# Patient Record
Sex: MALE | Race: BLACK OR AFRICAN AMERICAN | Employment: UNEMPLOYED | URBAN - METROPOLITAN AREA
[De-identification: names, ages, dates, MRNs, and addresses within clinical notes are randomized per-mention and may not be internally consistent; named-entity substitution may affect disease eponyms.]

---

## 2018-03-15 ENCOUNTER — APPOINTMENT (OUTPATIENT)
Dept: GENERAL RADIOLOGY | Age: 23
End: 2018-03-15
Payer: MEDICAID

## 2018-03-15 ENCOUNTER — HOSPITAL ENCOUNTER (EMERGENCY)
Age: 23
Discharge: HOME | End: 2018-03-15
Payer: MEDICAID

## 2018-03-15 VITALS
WEIGHT: 160 LBS | BODY MASS INDEX: 23.7 KG/M2 | HEART RATE: 81 BPM | OXYGEN SATURATION: 98 % | HEIGHT: 69 IN | TEMPERATURE: 98 F | RESPIRATION RATE: 16 BRPM

## 2018-03-15 DIAGNOSIS — M25.571 ACUTE RIGHT ANKLE PAIN: Primary | ICD-10-CM

## 2018-03-15 PROCEDURE — 73610 X-RAY EXAM OF ANKLE: CPT

## 2018-03-15 PROCEDURE — 99283 EMERGENCY DEPT VISIT LOW MDM: CPT

## 2018-03-15 RX ORDER — NAPROXEN 500 MG/1
500 TABLET ORAL 2 TIMES DAILY WITH MEALS
Qty: 14 TABLET | Refills: 0 | Status: SHIPPED | OUTPATIENT
Start: 2018-03-15 | End: 2018-08-16

## 2018-03-15 ASSESSMENT — PAIN SCALES - GENERAL: PAINLEVEL_OUTOF10: 8

## 2018-03-15 ASSESSMENT — PAIN DESCRIPTION - ORIENTATION: ORIENTATION: RIGHT

## 2018-03-15 ASSESSMENT — PAIN DESCRIPTION - LOCATION: LOCATION: ANKLE

## 2018-03-15 NOTE — ED PROVIDER NOTES
Capillary refill: normal.  Knee:              Tenderness:  none. Swelling: None. Deformity: no.             ROM: full range of motion. Skin:  no erythema, rash or wounds noted. Foot:              Tenderness:  none. Swelling: None. Deformity: no.             ROM: full range of motion. Skin:  no erythema, rash or wounds noted. Gait:  normal.   Lymphatics: No lymphangitis or adenopathy noted. Neurological:  Oriented. Motor functions intact. Lab / Imaging Results   (All laboratory and radiology results have been personally reviewed by myself)  Labs:  No results found for this visit on 03/15/18. Imaging: All Radiology results interpreted by Radiologist unless otherwise noted. XR ANKLE RIGHT (MIN 3 VIEWS)   Final Result   No convincing fracture or dislocation. ED Course / Medical Decision Making   Medications - No data to display  ED Course      Consults:   None    Procedure(s):   none    MDM:       Films were obtained based on moderate suspicion for bony injury as per history/physical findings . Plan is subsequently for symptom control, limited use and  immobilization with appropriate outpatient follow-up. X-ray shows no acute process. Certainly anti-inflammatories. Will follow with corporate care. Discharged from the ER in stable condition. Counseling: The emergency provider has spoken with the patient and discussed todays results, in addition to providing specific details for the plan of care and counseling regarding the diagnosis and prognosis. Questions are answered at this time and they are agreeable with the plan. Assessment      1. Acute right ankle pain      Plan   Discharge to home  Patient condition is good    New Medications     Current Discharge Medication List        Electronically signed by LITA Springer   DD: 3/15/18  **This report was transcribed using voice recognition software.

## 2018-04-13 ENCOUNTER — HOSPITAL ENCOUNTER (OUTPATIENT)
Dept: MRI IMAGING | Age: 23
Discharge: HOME | End: 2018-04-15
Payer: MEDICAID

## 2018-04-13 DIAGNOSIS — S93.401A SPRAIN OF RIGHT ANKLE, UNSPECIFIED LIGAMENT, INITIAL ENCOUNTER: ICD-10-CM

## 2018-04-13 PROCEDURE — 73721 MRI JNT OF LWR EXTRE W/O DYE: CPT

## 2018-08-16 ENCOUNTER — HOSPITAL ENCOUNTER (EMERGENCY)
Age: 23
Discharge: HOME | End: 2018-08-17
Payer: MEDICAID

## 2018-08-16 VITALS
WEIGHT: 160 LBS | HEIGHT: 69 IN | RESPIRATION RATE: 16 BRPM | SYSTOLIC BLOOD PRESSURE: 154 MMHG | BODY MASS INDEX: 23.7 KG/M2 | OXYGEN SATURATION: 99 % | DIASTOLIC BLOOD PRESSURE: 95 MMHG | HEART RATE: 82 BPM | TEMPERATURE: 97.9 F

## 2018-08-16 DIAGNOSIS — S93.401A SPRAIN OF RIGHT ANKLE, UNSPECIFIED LIGAMENT, INITIAL ENCOUNTER: Primary | ICD-10-CM

## 2018-08-16 PROCEDURE — 99283 EMERGENCY DEPT VISIT LOW MDM: CPT

## 2018-08-16 ASSESSMENT — PAIN DESCRIPTION - PAIN TYPE: TYPE: CHRONIC PAIN

## 2018-08-16 ASSESSMENT — PAIN DESCRIPTION - LOCATION: LOCATION: FOOT

## 2018-08-16 ASSESSMENT — PAIN DESCRIPTION - DESCRIPTORS: DESCRIPTORS: STABBING

## 2018-08-16 ASSESSMENT — PAIN SCALES - GENERAL: PAINLEVEL_OUTOF10: 8

## 2018-08-16 ASSESSMENT — PAIN DESCRIPTION - ORIENTATION: ORIENTATION: RIGHT

## 2018-08-17 ENCOUNTER — APPOINTMENT (OUTPATIENT)
Dept: GENERAL RADIOLOGY | Age: 23
End: 2018-08-17
Payer: MEDICAID

## 2018-08-17 PROCEDURE — 73610 X-RAY EXAM OF ANKLE: CPT

## 2018-08-17 RX ORDER — NAPROXEN 500 MG/1
500 TABLET ORAL 2 TIMES DAILY
Qty: 14 TABLET | Refills: 0 | Status: SHIPPED | OUTPATIENT
Start: 2018-08-17 | End: 2018-09-25

## 2018-08-17 NOTE — ED PROVIDER NOTES
[facility]  HPI:  8/16/18, Time: 11:49 PM         Barb Copeland is a 24 y.o. male presenting to the ED for right ankle pain , beginning today . The complaint has been persistent, mild in severity, and worsened by movement of ankle . Patient comes in with complaint of right foot pain. He states he has a history of frequent ankle dislocations in the past he states he was at work today when his ankle dislocated twice he was able to self reduce on the ankle. He states he's having some swelling to the area now. Denies any fall or injury. Review of Systems:   Pertinent positives and negatives are stated within HPI, all other systems reviewed and are negative.          --------------------------------------------- PAST HISTORY ---------------------------------------------  Past Medical History:  has a past medical history of Anxiety attack and Schizophrenia ([facility] [address] Past Surgical History:  has no past surgical history on file. Social History:  reports that he has never smoked. He has never used smokeless tobacco. He reports that he does not drink alcohol or use drugs. Family History: family history is not on file. The patients home medications have been reviewed. Allergies: Depakote [divalproex sodium]    -------------------------------------------------- RESULTS -------------------------------------------------  All laboratory and radiology results have been personally reviewed by myself   LABS:  No results found for this visit on 08/16/18. RADIOLOGY:  Interpreted by Radiologist.  XR ANKLE RIGHT (MIN 3 VIEWS)    (Results Pending)       ------------------------- NURSING NOTES AND VITALS REVIEWED ---------------------------   The nursing notes within the ED encounter and vital signs as below have been reviewed.    BP (!) 154/95   Pulse 82   Temp 97.9 °F (36.6 °C)   Resp 16   Ht 5' 9\" (1.753 m)   Wt 160 lb (72.6 kg)   SpO2 99%   BMI 23.63 kg/m²   Oxygen Saturation Interpretation:

## 2018-09-25 ENCOUNTER — HOSPITAL ENCOUNTER (EMERGENCY)
Age: 23
Discharge: HOME | End: 2018-09-25
Payer: MEDICAID

## 2018-09-25 ENCOUNTER — APPOINTMENT (OUTPATIENT)
Dept: GENERAL RADIOLOGY | Age: 23
End: 2018-09-25
Payer: MEDICAID

## 2018-09-25 VITALS
HEART RATE: 55 BPM | OXYGEN SATURATION: 95 % | DIASTOLIC BLOOD PRESSURE: 71 MMHG | SYSTOLIC BLOOD PRESSURE: 121 MMHG | TEMPERATURE: 97 F | RESPIRATION RATE: 17 BRPM

## 2018-09-25 DIAGNOSIS — M25.511 CHRONIC RIGHT SHOULDER PAIN: ICD-10-CM

## 2018-09-25 DIAGNOSIS — G89.29 CHRONIC RIGHT SHOULDER PAIN: ICD-10-CM

## 2018-09-25 DIAGNOSIS — M21.829 HILL SACHS DEFORMITY: ICD-10-CM

## 2018-09-25 DIAGNOSIS — Z87.39 HISTORY OF CLOSED SHOULDER DISLOCATION: Primary | ICD-10-CM

## 2018-09-25 PROCEDURE — 99283 EMERGENCY DEPT VISIT LOW MDM: CPT

## 2018-09-25 PROCEDURE — 73030 X-RAY EXAM OF SHOULDER: CPT

## 2018-09-25 RX ORDER — NAPROXEN 500 MG/1
500 TABLET ORAL 2 TIMES DAILY
Qty: 14 TABLET | Refills: 0 | Status: SHIPPED | OUTPATIENT
Start: 2018-09-25 | End: 2019-03-07

## 2018-09-25 ASSESSMENT — PAIN DESCRIPTION - LOCATION: LOCATION: SHOULDER

## 2018-09-25 ASSESSMENT — PAIN SCALES - GENERAL: PAINLEVEL_OUTOF10: 8

## 2018-09-25 ASSESSMENT — PAIN DESCRIPTION - FREQUENCY: FREQUENCY: INTERMITTENT

## 2018-09-25 ASSESSMENT — PAIN DESCRIPTION - ORIENTATION: ORIENTATION: RIGHT

## 2018-09-25 ASSESSMENT — PAIN DESCRIPTION - PAIN TYPE: TYPE: CHRONIC PAIN

## 2018-09-25 NOTE — ED PROVIDER NOTES
Right anterior. Tenderness: mild              Swelling: Mild. Deformity: no.              ROM: decreased due to pain and concern for further dislocation. Skin:  no erythema, rash or wounds noted. Neurovascular: Motor deficit: decreased ROM due to pain and fear of dislocating. Sensory deficit: none. Pulse deficit: none. Capillary refill: normal.    Elbow:              Tenderness:  none. Swelling: None. Deformity: no.              ROM: full range of motion. Skin:  no erythema, rash or wounds noted. Lymphatics: No lymphangitis or edema noted  Neurological:  Oriented. Motor functions intact. Lab / Imaging Results   (All laboratory and radiology results have been personally reviewed by myself)  Labs:  No results found for this visit on 09/25/18. Imaging: All Radiology results interpreted by Radiologist unless otherwise noted. XR SHOULDER RIGHT (MIN 2 VIEWS)   Final Result   1. No acute osseous injury is identified. Followup imaging can be   performed for persistent or worsening symptoms. 2.  Possible Hill-Sachs lesion. ED Course / Medical Decision Making   Medications - No data to display     Consult(s):   None    Procedure(s):   none    MDM:   Pt presents for hx of shoulder dislocations. Films were obtained based on low suspicion for bony injury as per history/physical findings. Hill sachs deformity seen indicating previous dislocations. In place at this time. Will place in sling/swathe and give pt name of ortho to follow-up for persistent dislocations. Pt denies pain meds in ER. Will be sent home with Naprosyn. Plan is subsequently for symptom control, limited use and  immobilization with appropriate outpatient follow-up. Pt is in no acute distress, non-toxic, and appropriate for outpatient management. Pt educated on need to return to ER if new or worsening symptoms.  Pt

## 2018-09-26 ENCOUNTER — TELEPHONE (OUTPATIENT)
Dept: ORTHOPEDIC SURGERY | Age: 23
End: 2018-09-26

## 2018-10-02 ENCOUNTER — APPOINTMENT (OUTPATIENT)
Dept: GENERAL RADIOLOGY | Age: 23
End: 2018-10-02
Payer: MEDICAID

## 2018-10-02 ENCOUNTER — HOSPITAL ENCOUNTER (EMERGENCY)
Age: 23
Discharge: HOME | End: 2018-10-02
Payer: MEDICAID

## 2018-10-02 VITALS
HEIGHT: 69 IN | RESPIRATION RATE: 16 BRPM | BODY MASS INDEX: 23.7 KG/M2 | DIASTOLIC BLOOD PRESSURE: 74 MMHG | HEART RATE: 57 BPM | OXYGEN SATURATION: 98 % | WEIGHT: 160 LBS | TEMPERATURE: 97.1 F | SYSTOLIC BLOOD PRESSURE: 142 MMHG

## 2018-10-02 DIAGNOSIS — M25.511 ACUTE PAIN OF RIGHT SHOULDER: Primary | ICD-10-CM

## 2018-10-02 PROCEDURE — 99283 EMERGENCY DEPT VISIT LOW MDM: CPT

## 2018-10-02 PROCEDURE — 73030 X-RAY EXAM OF SHOULDER: CPT

## 2018-10-02 RX ORDER — IBUPROFEN 800 MG/1
800 TABLET ORAL EVERY 8 HOURS PRN
Qty: 21 TABLET | Refills: 0 | Status: SHIPPED | OUTPATIENT
Start: 2018-10-02 | End: 2019-03-07

## 2018-10-03 NOTE — ED PROVIDER NOTES
this time and they are agreeable with the plan.      --------------------------------- IMPRESSION AND DISPOSITION ---------------------------------    IMPRESSION  1. Acute pain of right shoulder        DISPOSITION  Disposition: Discharge to home  Patient condition is good      NOTE: This report was transcribed using voice recognition software.  Every effort was made to ensure accuracy; however, inadvertent computerized transcription errors may be present     Ebony Ceballos, ADRIA - CNP  10/03/18 0232

## 2018-10-17 ENCOUNTER — OFFICE VISIT (OUTPATIENT)
Dept: ORTHOPEDIC SURGERY | Age: 23
End: 2018-10-17
Payer: MEDICAID

## 2018-10-17 VITALS
HEART RATE: 55 BPM | HEIGHT: 70 IN | BODY MASS INDEX: 23.62 KG/M2 | TEMPERATURE: 97.6 F | SYSTOLIC BLOOD PRESSURE: 137 MMHG | DIASTOLIC BLOOD PRESSURE: 80 MMHG | WEIGHT: 165 LBS

## 2018-10-17 DIAGNOSIS — M25.311 SHOULDER INSTABILITY, RIGHT: Primary | ICD-10-CM

## 2018-10-17 DIAGNOSIS — M25.312 SHOULDER INSTABILITY, LEFT: ICD-10-CM

## 2018-10-17 PROCEDURE — G8427 DOCREV CUR MEDS BY ELIG CLIN: HCPCS | Performed by: ORTHOPAEDIC SURGERY

## 2018-10-17 PROCEDURE — 99214 OFFICE O/P EST MOD 30 MIN: CPT | Performed by: ORTHOPAEDIC SURGERY

## 2018-10-17 PROCEDURE — G8420 CALC BMI NORM PARAMETERS: HCPCS | Performed by: ORTHOPAEDIC SURGERY

## 2018-10-17 PROCEDURE — 1036F TOBACCO NON-USER: CPT | Performed by: ORTHOPAEDIC SURGERY

## 2018-10-17 PROCEDURE — G8484 FLU IMMUNIZE NO ADMIN: HCPCS | Performed by: ORTHOPAEDIC SURGERY

## 2018-10-17 NOTE — PROGRESS NOTES
New Shoulder Patient Visit     Referring Provider:   Senia Garcia MD  [facility]  [place], [address]    CHIEF COMPLAINT:   Chief Complaint   Patient presents with    Shoulder Pain     Right shoulder dislocation. Pt. states it started 8 yrs. ago weight lifting. Most recent dislocation 2-3 wks ago (boxing sparring). HPI:      Mary Carmen Pierce is a 24y.o. year old male who is seen today  for evaluation of right shoulder pain. He states that about 8 years ago he suffered an injury to the shoulder where he felt his shoulder \"dislocated\" while lifting doing overhead press. Since that time he has had several instances where he felt the shoulder pop out of place. It has never required reduction in the emergency department. He does remain active and enjoys boxing sparring. He has had issues with this due to pain. Most recent event was 3 weeks ago when he felt the shoulder pop out and go back in. Since that time he has improved somewhat. He has not been Doing much with the shoulder. He has had no recent treatment      PAST MEDICAL HISTORY  Past Medical History:   Diagnosis Date    Anxiety attack     Schizophrenia ([facility] [address]        PAST SURGICAL HISTORY  Past Surgical History:   Procedure Laterality Date    DENTAL SURGERY         FAMILY HISTORY   History reviewed. No pertinent family history. SOCIAL HISTORY  Social History     Occupational History    Not on file.      Social History Main Topics    Smoking status: Never Smoker    Smokeless tobacco: Never Used    Alcohol use No      Comment: denies at this time    Drug use: No    Sexual activity: Not on file       CURRENT MEDICATIONS     Current Outpatient Prescriptions:     ibuprofen (IBU) 800 MG tablet, Take 1 tablet by mouth every 8 hours as needed for Pain, Disp: 21 tablet, Rfl: 0    naproxen (NAPROSYN) 500 MG tablet, Take 1 tablet by mouth 2 times daily for 7 days, Disp: 14 tablet, Rfl: 0    ALLERGIES  Allergies   Allergen

## 2019-03-07 ENCOUNTER — APPOINTMENT (OUTPATIENT)
Dept: GENERAL RADIOLOGY | Age: 24
End: 2019-03-07
Payer: MEDICAID

## 2019-03-07 ENCOUNTER — HOSPITAL ENCOUNTER (EMERGENCY)
Age: 24
Discharge: HOME | End: 2019-03-07
Attending: EMERGENCY MEDICINE
Payer: MEDICAID

## 2019-03-07 VITALS
OXYGEN SATURATION: 98 % | HEART RATE: 68 BPM | BODY MASS INDEX: 19.25 KG/M2 | TEMPERATURE: 97.6 F | DIASTOLIC BLOOD PRESSURE: 82 MMHG | HEIGHT: 74 IN | SYSTOLIC BLOOD PRESSURE: 118 MMHG | RESPIRATION RATE: 16 BRPM | WEIGHT: 150 LBS

## 2019-03-07 DIAGNOSIS — M25.511 ACUTE PAIN OF RIGHT SHOULDER: Primary | ICD-10-CM

## 2019-03-07 PROCEDURE — 99283 EMERGENCY DEPT VISIT LOW MDM: CPT

## 2019-03-07 PROCEDURE — 6370000000 HC RX 637 (ALT 250 FOR IP): Performed by: EMERGENCY MEDICINE

## 2019-03-07 PROCEDURE — 73030 X-RAY EXAM OF SHOULDER: CPT

## 2019-03-07 RX ORDER — IBUPROFEN 800 MG/1
800 TABLET ORAL EVERY 8 HOURS PRN
Qty: 15 TABLET | Refills: 0 | Status: SHIPPED | OUTPATIENT
Start: 2019-03-07 | End: 2019-03-20 | Stop reason: ALTCHOICE

## 2019-03-07 RX ORDER — IBUPROFEN 800 MG/1
800 TABLET ORAL ONCE
Status: COMPLETED | OUTPATIENT
Start: 2019-03-07 | End: 2019-03-07

## 2019-03-07 RX ADMIN — IBUPROFEN 800 MG: 800 TABLET ORAL at 05:51

## 2019-03-07 ASSESSMENT — PAIN DESCRIPTION - LOCATION: LOCATION: SHOULDER

## 2019-03-07 ASSESSMENT — PAIN DESCRIPTION - FREQUENCY: FREQUENCY: CONTINUOUS

## 2019-03-07 ASSESSMENT — PAIN DESCRIPTION - PAIN TYPE: TYPE: ACUTE PAIN

## 2019-03-07 ASSESSMENT — PAIN DESCRIPTION - ONSET: ONSET: ON-GOING

## 2019-03-07 ASSESSMENT — PAIN SCALES - GENERAL: PAINLEVEL_OUTOF10: 8

## 2019-03-07 ASSESSMENT — PAIN DESCRIPTION - ORIENTATION: ORIENTATION: RIGHT

## 2019-03-07 ASSESSMENT — PAIN DESCRIPTION - DESCRIPTORS: DESCRIPTORS: TIRING

## 2019-03-12 ENCOUNTER — OFFICE VISIT (OUTPATIENT)
Dept: FAMILY MEDICINE CLINIC | Age: 24
End: 2019-03-12
Payer: MEDICAID

## 2019-03-12 VITALS
SYSTOLIC BLOOD PRESSURE: 137 MMHG | HEIGHT: 70 IN | HEART RATE: 71 BPM | OXYGEN SATURATION: 98 % | RESPIRATION RATE: 18 BRPM | DIASTOLIC BLOOD PRESSURE: 76 MMHG | BODY MASS INDEX: 25.77 KG/M2 | WEIGHT: 180 LBS | TEMPERATURE: 98 F

## 2019-03-12 DIAGNOSIS — M25.511 RIGHT SHOULDER PAIN, UNSPECIFIED CHRONICITY: ICD-10-CM

## 2019-03-12 PROCEDURE — 99212 OFFICE O/P EST SF 10 MIN: CPT | Performed by: FAMILY MEDICINE

## 2019-03-12 PROCEDURE — 1036F TOBACCO NON-USER: CPT | Performed by: FAMILY MEDICINE

## 2019-03-12 PROCEDURE — G8484 FLU IMMUNIZE NO ADMIN: HCPCS | Performed by: FAMILY MEDICINE

## 2019-03-12 PROCEDURE — G8419 CALC BMI OUT NRM PARAM NOF/U: HCPCS | Performed by: FAMILY MEDICINE

## 2019-03-12 PROCEDURE — G8427 DOCREV CUR MEDS BY ELIG CLIN: HCPCS | Performed by: FAMILY MEDICINE

## 2019-03-12 PROCEDURE — 99213 OFFICE O/P EST LOW 20 MIN: CPT | Performed by: FAMILY MEDICINE

## 2019-03-12 ASSESSMENT — ENCOUNTER SYMPTOMS
NAUSEA: 0
ABDOMINAL PAIN: 0
PHOTOPHOBIA: 0
VOMITING: 0
ABDOMINAL DISTENTION: 0
WHEEZING: 0
COUGH: 0
CHEST TIGHTNESS: 0
CONSTIPATION: 0
SHORTNESS OF BREATH: 0
DIARRHEA: 0

## 2019-03-12 ASSESSMENT — PATIENT HEALTH QUESTIONNAIRE - PHQ9
1. LITTLE INTEREST OR PLEASURE IN DOING THINGS: 0
SUM OF ALL RESPONSES TO PHQ QUESTIONS 1-9: 2
SUM OF ALL RESPONSES TO PHQ QUESTIONS 1-9: 2
2. FEELING DOWN, DEPRESSED OR HOPELESS: 2
SUM OF ALL RESPONSES TO PHQ9 QUESTIONS 1 & 2: 2

## 2019-03-20 ENCOUNTER — OFFICE VISIT (OUTPATIENT)
Dept: ORTHOPEDIC SURGERY | Age: 24
End: 2019-03-20
Payer: MEDICAID

## 2019-03-20 VITALS
SYSTOLIC BLOOD PRESSURE: 135 MMHG | HEIGHT: 69 IN | HEART RATE: 55 BPM | TEMPERATURE: 97.7 F | WEIGHT: 180 LBS | BODY MASS INDEX: 26.66 KG/M2 | DIASTOLIC BLOOD PRESSURE: 73 MMHG

## 2019-03-20 DIAGNOSIS — M25.312 SHOULDER INSTABILITY, LEFT: Primary | ICD-10-CM

## 2019-03-20 DIAGNOSIS — M25.311 SHOULDER INSTABILITY, RIGHT: ICD-10-CM

## 2019-03-20 PROCEDURE — G8419 CALC BMI OUT NRM PARAM NOF/U: HCPCS | Performed by: ORTHOPAEDIC SURGERY

## 2019-03-20 PROCEDURE — G8484 FLU IMMUNIZE NO ADMIN: HCPCS | Performed by: ORTHOPAEDIC SURGERY

## 2019-03-20 PROCEDURE — 99213 OFFICE O/P EST LOW 20 MIN: CPT | Performed by: ORTHOPAEDIC SURGERY

## 2019-03-20 PROCEDURE — G8427 DOCREV CUR MEDS BY ELIG CLIN: HCPCS | Performed by: ORTHOPAEDIC SURGERY

## 2019-03-20 PROCEDURE — 1036F TOBACCO NON-USER: CPT | Performed by: ORTHOPAEDIC SURGERY

## 2019-03-25 ENCOUNTER — TELEPHONE (OUTPATIENT)
Dept: FAMILY MEDICINE CLINIC | Age: 24
End: 2019-03-25

## 2019-03-25 DIAGNOSIS — G89.29 CHRONIC LEFT SHOULDER PAIN: Primary | ICD-10-CM

## 2019-03-25 DIAGNOSIS — M25.512 CHRONIC LEFT SHOULDER PAIN: Primary | ICD-10-CM

## 2019-03-27 ENCOUNTER — HOSPITAL ENCOUNTER (OUTPATIENT)
Dept: PHYSICAL THERAPY | Age: 24
Setting detail: THERAPIES SERIES
Discharge: HOME | End: 2019-03-27
Payer: MEDICAID

## 2019-04-09 ENCOUNTER — HOSPITAL ENCOUNTER (OUTPATIENT)
Dept: MRI IMAGING | Age: 24
Discharge: HOME | End: 2019-04-11
Payer: MEDICAID

## 2019-04-09 ENCOUNTER — HOSPITAL ENCOUNTER (OUTPATIENT)
Dept: GENERAL RADIOLOGY | Age: 24
Discharge: HOME | End: 2019-04-11
Payer: MEDICAID

## 2019-04-09 VITALS
OXYGEN SATURATION: 100 % | WEIGHT: 180 LBS | SYSTOLIC BLOOD PRESSURE: 120 MMHG | HEIGHT: 69 IN | DIASTOLIC BLOOD PRESSURE: 56 MMHG | HEART RATE: 63 BPM | BODY MASS INDEX: 26.66 KG/M2 | RESPIRATION RATE: 18 BRPM

## 2019-04-09 DIAGNOSIS — M25.311 SHOULDER INSTABILITY, RIGHT: ICD-10-CM

## 2019-04-09 PROCEDURE — 6360000004 HC RX CONTRAST MEDICATION: Performed by: RADIOLOGY

## 2019-04-09 PROCEDURE — 6360000004 HC RX CONTRAST MEDICATION: Performed by: PHYSICIAN ASSISTANT

## 2019-04-09 PROCEDURE — 73222 MRI JOINT UPR EXTREM W/DYE: CPT

## 2019-04-09 PROCEDURE — 73040 CONTRAST X-RAY OF SHOULDER: CPT

## 2019-04-09 PROCEDURE — 23350 INJECTION FOR SHOULDER X-RAY: CPT

## 2019-04-09 PROCEDURE — A9579 GAD-BASE MR CONTRAST NOS,1ML: HCPCS | Performed by: PHYSICIAN ASSISTANT

## 2019-04-09 RX ADMIN — GADOTERIDOL 1 ML: 279.3 INJECTION, SOLUTION INTRAVENOUS at 09:10

## 2019-04-09 RX ADMIN — IOPAMIDOL 15 ML: 408 INJECTION, SOLUTION INTRATHECAL at 08:09

## 2019-04-09 NOTE — H&P
Interventional Radiology   Attending Pre-operative History and Physical    DIAGNOSIS:    Patient Active Problem List   Diagnosis    Schizophrenia ([facility] [address]       CHIEF COMPLAINT:  Right shoulder pain    No current outpatient medications on file. Current Facility-Administered Medications:     iopamidol (ISOVUE-M 200) 41 % injection 15 mL, 15 mL, Intravenous, ONCE PRN, Susie Kelly MD    Allergies   Allergen Reactions    Depakote [Divalproex Sodium]      Seizure        Past Medical History:   Diagnosis Date    Anxiety attack     Schizophrenia [facility])        Past Surgical History:   Procedure Laterality Date    DENTAL SURGERY         No family history on file.     Social History     Socioeconomic History    Marital status:      Spouse name: Not on file    Number of children: Not on file    Years of education: Not on file    Highest education level: Not on file   Occupational History    Not on file   Social Needs    Financial resource strain: Not on file    Food insecurity:     Worry: Not on file     Inability: Not on file    Transportation needs:     Medical: Not on file     Non-medical: Not on file   Tobacco Use    Smoking status: Never Smoker    Smokeless tobacco: Never Used   Substance and Sexual Activity    Alcohol use: No     Comment: denies at this time    Drug use: No    Sexual activity: Not on file   Lifestyle    Physical activity:     Days per week: Not on file     Minutes per session: Not on file    Stress: Not on file   Relationships    Social connections:     Talks on phone: Not on file     Gets together: Not on file     Attends Taoist service: Not on file     Active member of club or organization: Not on file     Attends meetings of clubs or organizations: Not on file     Relationship status: Not on file    Intimate partner violence:     Fear of current or ex partner: Not on file     Emotionally abused: Not on file     Physically abused: Not on file     Forced

## 2019-04-09 NOTE — PROGRESS NOTES
IRFLOWSHEET    Date: 4/9/2019    Time: 7:51 AM     Exam: Fluoroscopy Guided RIGHT shoulder Arthrogram    Radiologist Performing Procedure: Heidy Betts PA-C    Nurse Reporting/Phone:     Nurse Receiving: Susan Willson    Permit signed:      Yes    ID Band Checklist: Yes    Allergies: Depakote [divalproex sodium]     TIME OUT: 0818    PROCEDURE START TIME: 9340    Puncture Site: Right anterior shoulder     Puncture Time: 0820    Catheters: 20gx3.5\"    LABS: No results found for: INR, PROTIME        Lab Results   Component Value Date    CREATININE 1.3 (H) 02/01/2017    BUN 13 02/01/2017          Lab Results   Component Value Date    HGB 13.2 02/01/2017    HCT 40.8 02/01/2017     02/01/2017         PROCEDURE END TIME: 0825    Total Contrast: Isovue M200 (15cc), Prohance (0.1cc)    Fluoroscopy Time: 0.7 min    Complications:  None    Comments: Patient brought into room, discussed procedure. Heidy Betts PA-C answered all questions and concerns related to the procedure. Treatment consent signed. Patient positioned and sterile prepped for the procedure. 1% Lidocaine administered by [employer]. Patient tolerated procedure well. Right anterior shoulder site cleaned and dressed with a bandage. Vitals stable pre/post procedure. Discharge papers reviewed and signed. Patient escorted out of department with all belongings and without distress.

## 2019-04-09 NOTE — BRIEF OP NOTE
Brief Postoperative Note    Troy Pickens  YOB: 1995  [id number]    Pre-operative Diagnosis and Procedure: right shoulder pain; right shoulder arthrogram    Post-operative Diagnosis: Same    Anesthesia: Local    Estimated Blood Loss: < 10 cc    Provider: Chano Garcia PA-C, indirect supervision by Alpa Hyatt M.D.     Complications: none    Specimen obtained: none     Findings: none     CANDACE HSU   4/9/2019 8:10 AM

## 2019-04-22 ENCOUNTER — TELEPHONE (OUTPATIENT)
Dept: ORTHOPEDIC SURGERY | Age: 24
End: 2019-04-22

## 2019-05-06 ENCOUNTER — OFFICE VISIT (OUTPATIENT)
Dept: ORTHOPEDIC SURGERY | Age: 24
End: 2019-05-06
Payer: MEDICAID

## 2019-05-06 VITALS
TEMPERATURE: 97.3 F | HEIGHT: 69 IN | WEIGHT: 170 LBS | BODY MASS INDEX: 25.18 KG/M2 | SYSTOLIC BLOOD PRESSURE: 131 MMHG | HEART RATE: 68 BPM | DIASTOLIC BLOOD PRESSURE: 73 MMHG

## 2019-05-06 DIAGNOSIS — M25.311 SHOULDER INSTABILITY, RIGHT: Primary | ICD-10-CM

## 2019-05-06 PROCEDURE — G8427 DOCREV CUR MEDS BY ELIG CLIN: HCPCS | Performed by: ORTHOPAEDIC SURGERY

## 2019-05-06 PROCEDURE — G8419 CALC BMI OUT NRM PARAM NOF/U: HCPCS | Performed by: ORTHOPAEDIC SURGERY

## 2019-05-06 PROCEDURE — 1036F TOBACCO NON-USER: CPT | Performed by: ORTHOPAEDIC SURGERY

## 2019-05-06 PROCEDURE — 99213 OFFICE O/P EST LOW 20 MIN: CPT | Performed by: ORTHOPAEDIC SURGERY

## 2019-05-15 ENCOUNTER — HOSPITAL ENCOUNTER (OUTPATIENT)
Dept: CT IMAGING | Age: 24
Discharge: HOME | End: 2019-05-17
Payer: MEDICAID

## 2019-05-15 DIAGNOSIS — M25.311 SHOULDER INSTABILITY, RIGHT: ICD-10-CM

## 2019-05-15 PROCEDURE — 76376 3D RENDER W/INTRP POSTPROCES: CPT

## 2019-05-15 PROCEDURE — 73200 CT UPPER EXTREMITY W/O DYE: CPT

## 2019-06-05 ENCOUNTER — OFFICE VISIT (OUTPATIENT)
Dept: ORTHOPEDIC SURGERY | Age: 24
End: 2019-06-05
Payer: MEDICAID

## 2019-06-05 VITALS
HEIGHT: 69 IN | DIASTOLIC BLOOD PRESSURE: 81 MMHG | HEART RATE: 75 BPM | SYSTOLIC BLOOD PRESSURE: 142 MMHG | BODY MASS INDEX: 25.18 KG/M2 | WEIGHT: 170 LBS

## 2019-06-05 DIAGNOSIS — M25.311 SHOULDER INSTABILITY, RIGHT: ICD-10-CM

## 2019-06-05 DIAGNOSIS — Z01.818 PRE-OP EXAM: Primary | ICD-10-CM

## 2019-06-05 PROCEDURE — 1036F TOBACCO NON-USER: CPT | Performed by: ORTHOPAEDIC SURGERY

## 2019-06-05 PROCEDURE — G8427 DOCREV CUR MEDS BY ELIG CLIN: HCPCS | Performed by: ORTHOPAEDIC SURGERY

## 2019-06-05 PROCEDURE — 99213 OFFICE O/P EST LOW 20 MIN: CPT | Performed by: ORTHOPAEDIC SURGERY

## 2019-06-05 PROCEDURE — G8419 CALC BMI OUT NRM PARAM NOF/U: HCPCS | Performed by: ORTHOPAEDIC SURGERY

## 2019-06-05 RX ORDER — HYDROCODONE BITARTRATE AND ACETAMINOPHEN 5; 325 MG/1; MG/1
1 TABLET ORAL EVERY 6 HOURS PRN
Qty: 20 TABLET | Refills: 0 | Status: SHIPPED | OUTPATIENT
Start: 2019-06-05 | End: 2019-06-10

## 2019-06-05 RX ORDER — KETOROLAC TROMETHAMINE 10 MG/1
10 TABLET, FILM COATED ORAL EVERY 6 HOURS PRN
Qty: 8 TABLET | Refills: 0 | Status: SHIPPED | OUTPATIENT
Start: 2019-06-05 | End: 2019-06-12

## 2019-06-05 NOTE — PROGRESS NOTES
Department of Orthopedic Surgery  Attending Pre-operative History and Physical        DIAGNOSIS:  Right shoulder recurrent instability     INDICATION:  Failed Conservative Management     PROCEDURE:  RIGHT SHOULDER ARTHROSCOPY LABRAL REPAIR WITH CAPSULORRHAPHY POSSIBLE REMPLISSAGE  ++ARTHREX++  ++ISB++    CHIEF COMPLAINT:  Right shoulder instability    History Obtained From:  patient    HISTORY OF PRESENT ILLNESS:      The patient is a 21 y.o. male with significant past medical history of right shoulder dislocation and resulting instability. He has had several dislocations in the past, most recently over 6 months ago. He was referred to me for assessment. He underwent MRI as well as CT scan. There did appear to be possibly a labral tissue as well as increased capsular volume anterior. He displayed anterior instability on exam.  Given his failure of conservative treatment he was offered arthroscopic stabilization. We discussed this would be in the form of labral repair and capsulorrhaphy with possible Remplissage. We discussed risks in detail. These risks include but are not limited to infection, neurovascular injury, postoperative stiffness, recurrent instability, need for other surgeries, unforeseen risks. He understands and would like to proceed    Past Medical History:        Diagnosis Date    Anxiety attack     Schizophrenia [facility])        Past Surgical History:        Procedure Laterality Date    DENTAL SURGERY         Medications Prior to Admission:   Not in a hospital admission. Allergies:  Depakote [divalproex sodium]    History of allergic reaction to anesthesia:  Never received anesthesia    Social History:   TOBACCO:   reports that he has never smoked. He has never used smokeless tobacco.  ETOH:   reports that he does not drink alcohol. DRUGS:   reports that he does not use drugs. Family History:   No family history on file.     REVIEW OF SYSTEMS:  CONSTITUTIONAL:  negative  RESPIRATORY: negative  CARDIOVASCULAR:  negative  MUSCULOSKELETAL:  negative except for  HPI  NEUROLOGICAL:  negative    PHYSICAL EXAM:     VITALS:  BP (!) 142/81 (Site: Right Upper Arm, Position: Sitting, Cuff Size: Medium Adult)   Pulse 75   Ht 5' 9\" (1.753 m)   Wt 170 lb (77.1 kg)   BMI 25.10 kg/m²     Gen: AOx3, NAD    Heart:  normal apical pulses, normal S1 and S2 and no edema    Lungs:  No increased work of breathing, good air exchange     Abdomen:  no scars, non-distended and non-tender    Extremities:  No clubbing, cyanosis, or edema    Musculoskeletal:  On exam of the shoulder, he can forward elevate about 150° with mild pain. External rotation is 30°. Internal rotation is to T6. In the supine position with the shoulder abductor 90°, external rotation is about 80° with apprehension. Internal rotation is 70°. Load-and-shift is to call to assess due to guarding. Belly press test is intact. Resisted external rotation is intact. Spencer's test is painful      DATA:  CBC:   Lab Results   Component Value Date    WBC 6.0 02/01/2017    RBC 5.07 02/01/2017    HGB 13.2 02/01/2017    HCT 40.8 02/01/2017    MCV 80.5 02/01/2017    MCH 26.0 02/01/2017    MCHC 32.4 02/01/2017    RDW 14.4 02/01/2017     02/01/2017    MPV 11.4 02/01/2017     BMP:    Lab Results   Component Value Date     02/01/2017    K 3.9 02/01/2017     02/01/2017    CO2 23 02/01/2017    BUN 13 02/01/2017    LABALBU 4.2 02/01/2017    CREATININE 1.3 02/01/2017    CALCIUM 8.6 02/01/2017    GFRAA >60 02/01/2017    LABGLOM >60 02/01/2017    GLUCOSE 106 02/01/2017       Radiology Review:  X-rays, MRI and CT reviewed. These show evidence of previous labral injury with possibly of labral tissue anterior. ASSESSMENT AND PLAN:    1. Patient is a 21 y.o. male with above specified procedure planned right shoulder arthroscopy, labral repair and capsulorrhaphy, possible Remplissage with anesthesia    2.   Procedure options, risks and benefits reviewed with patient. Patient expresses understanding and has signed consent form to proceed.     3.  Patient and family were provided with pre-op and post-op instructions, prescription medications, and any other supplied needed post op (slings, braces, etc.)    Controlled Substances Monitoring:            Lorena Preciado MD  Orthopaedic Surgery   6/5/19  9:21 AM

## 2019-06-05 NOTE — LETTER
[address] [facility].  [address] [facility] [address]  Phone: [phone]  Fax: [phone]    Lizbet Velasquez MD        June 5, 2019     Patient: Luis Durbin   YOB: 1995   Date of Visit: 6/5/2019       To Whom It May Concern: It is my medical opinion that Herber Ohara Is undergoing surgery on his right shoulder on 6/13/2019. I anticipate 3 months out from work that involves any lifting with his right arm. If you have any questions or concerns, please don't hesitate to call.     Sincerely,          Lizbet Velasquez MD

## 2019-06-06 ENCOUNTER — TELEPHONE (OUTPATIENT)
Dept: ORTHOPEDIC SURGERY | Age: 24
End: 2019-06-06

## 2019-06-07 ENCOUNTER — ANESTHESIA EVENT (OUTPATIENT)
Dept: OPERATING ROOM | Age: 24
End: 2019-06-07
Payer: MEDICAID

## 2019-06-12 RX ORDER — KETOROLAC TROMETHAMINE 10 MG/1
10 TABLET, FILM COATED ORAL EVERY 6 HOURS PRN
COMMUNITY
End: 2019-06-21

## 2019-06-12 NOTE — PROGRESS NOTES
[facility] PRE-ADMISSION TESTING INSTRUCTIONS    The Preadmission Testing patient is instructed accordingly using the following criteria (check applicable):    ARRIVAL INSTRUCTIONS:  [x] Parking the day of Surgery is located in the Main Entrance lot. Upon entering the door, make an immediate right to the surgery reception desk    [] [id number] is available Monday through Friday 6 am to 6 pm    [x] Bring photo ID and insurance card    [] Bring in a copy of Living will or Durable Power of  papers. [x] Please be sure to arrange for responsible adult to provide transportation to and from the hospital    [x] Please arrange for responsible adult to be with you for the 24 hour period post procedure due to having anesthesia      GENERAL INSTRUCTIONS:    [x] Nothing by mouth after midnight, including gum, candy, mints or water    [x] You may brush your teeth, but do not swallow any water    [x] Take medications as instructed with 1-2 oz of water    [] Stop herbal supplements and vitamins 5 days prior to procedure    [] Follow preop dosing of blood thinners per physician instructions    [] Take 1/2 dose of evening insulin, but no insulin after midnight    [] No oral diabetic medications after midnight    [] If diabetic and have low blood sugar or feel symptomatic, take 1-2oz apple juice only    [] Bring inhalers day of surgery    [] Bring C-PAP/ Bi-Pap day of surgery    [] Bring urine specimen day of surgery    [x] Shower or bath with soap, lather and rinse well, AM of Surgery, no lotion, powders or creams to surgical site    [] Follow bowel prep as instructed per surgeon    [x] No tobacco products within 24 hours of surgery     [x] No alcohol or illegal drug use within 24 hours of surgery.     [x] Jewelry, body piercing's, eyeglasses, contact lenses and dentures are not permitted into surgery (bring cases)      [] Please do not wear any nail polish, make up or hair

## 2019-06-13 ENCOUNTER — HOSPITAL ENCOUNTER (OUTPATIENT)
Age: 24
Setting detail: OUTPATIENT SURGERY
Discharge: HOME | End: 2019-06-13
Attending: ORTHOPAEDIC SURGERY | Admitting: ORTHOPAEDIC SURGERY
Payer: MEDICAID

## 2019-06-13 ENCOUNTER — ANESTHESIA (OUTPATIENT)
Dept: OPERATING ROOM | Age: 24
End: 2019-06-13
Payer: MEDICAID

## 2019-06-13 VITALS — DIASTOLIC BLOOD PRESSURE: 52 MMHG | TEMPERATURE: 95 F | SYSTOLIC BLOOD PRESSURE: 96 MMHG | OXYGEN SATURATION: 95 %

## 2019-06-13 VITALS
DIASTOLIC BLOOD PRESSURE: 72 MMHG | TEMPERATURE: 97.7 F | SYSTOLIC BLOOD PRESSURE: 134 MMHG | RESPIRATION RATE: 14 BRPM | OXYGEN SATURATION: 98 % | WEIGHT: 170 LBS | BODY MASS INDEX: 25.18 KG/M2 | HEIGHT: 69 IN | HEART RATE: 83 BPM

## 2019-06-13 DIAGNOSIS — Z98.890 S/P ARTHROSCOPY OF SHOULDER: Primary | ICD-10-CM

## 2019-06-13 PROCEDURE — 2500000003 HC RX 250 WO HCPCS: Performed by: REGISTERED NURSE

## 2019-06-13 PROCEDURE — 3600000004 HC SURGERY LEVEL 4 BASE: Performed by: ORTHOPAEDIC SURGERY

## 2019-06-13 PROCEDURE — 7100000011 HC PHASE II RECOVERY - ADDTL 15 MIN: Performed by: ORTHOPAEDIC SURGERY

## 2019-06-13 PROCEDURE — 6360000002 HC RX W HCPCS: Performed by: ORTHOPAEDIC SURGERY

## 2019-06-13 PROCEDURE — 2720000010 HC SURG SUPPLY STERILE: Performed by: ORTHOPAEDIC SURGERY

## 2019-06-13 PROCEDURE — C1713 ANCHOR/SCREW BN/BN,TIS/BN: HCPCS | Performed by: ORTHOPAEDIC SURGERY

## 2019-06-13 PROCEDURE — 3600000014 HC SURGERY LEVEL 4 ADDTL 15MIN: Performed by: ORTHOPAEDIC SURGERY

## 2019-06-13 PROCEDURE — 3700000000 HC ANESTHESIA ATTENDED CARE: Performed by: ORTHOPAEDIC SURGERY

## 2019-06-13 PROCEDURE — 2580000003 HC RX 258: Performed by: ORTHOPAEDIC SURGERY

## 2019-06-13 PROCEDURE — 6360000002 HC RX W HCPCS

## 2019-06-13 PROCEDURE — 2709999900 HC NON-CHARGEABLE SUPPLY: Performed by: ORTHOPAEDIC SURGERY

## 2019-06-13 PROCEDURE — 6360000002 HC RX W HCPCS: Performed by: REGISTERED NURSE

## 2019-06-13 PROCEDURE — L3650 SO 8 ABD RESTRAINT PRE OTS: HCPCS | Performed by: ORTHOPAEDIC SURGERY

## 2019-06-13 PROCEDURE — 7100000000 HC PACU RECOVERY - FIRST 15 MIN: Performed by: ORTHOPAEDIC SURGERY

## 2019-06-13 PROCEDURE — 7100000001 HC PACU RECOVERY - ADDTL 15 MIN: Performed by: ORTHOPAEDIC SURGERY

## 2019-06-13 PROCEDURE — 64415 NJX AA&/STRD BRCH PLXS IMG: CPT | Performed by: ANESTHESIOLOGY

## 2019-06-13 PROCEDURE — 2580000003 HC RX 258: Performed by: REGISTERED NURSE

## 2019-06-13 PROCEDURE — 2500000003 HC RX 250 WO HCPCS: Performed by: ORTHOPAEDIC SURGERY

## 2019-06-13 PROCEDURE — 29806 SHO ARTHRS SRG CAPSULORRAPHY: CPT | Performed by: ORTHOPAEDIC SURGERY

## 2019-06-13 PROCEDURE — 7100000010 HC PHASE II RECOVERY - FIRST 15 MIN: Performed by: ORTHOPAEDIC SURGERY

## 2019-06-13 PROCEDURE — 6370000000 HC RX 637 (ALT 250 FOR IP): Performed by: ANESTHESIOLOGY

## 2019-06-13 PROCEDURE — 3700000001 HC ADD 15 MINUTES (ANESTHESIA): Performed by: ORTHOPAEDIC SURGERY

## 2019-06-13 DEVICE — ANCHOR SUT L12.7MM DIA3MM BIOCOMPOSITE KNOTLESS W/ SZ 2: Type: IMPLANTABLE DEVICE | Site: SHOULDER | Status: FUNCTIONAL

## 2019-06-13 RX ORDER — ROPIVACAINE HYDROCHLORIDE 5 MG/ML
30 INJECTION, SOLUTION EPIDURAL; INFILTRATION; PERINEURAL ONCE
Status: COMPLETED | OUTPATIENT
Start: 2019-06-13 | End: 2019-06-13

## 2019-06-13 RX ORDER — ONDANSETRON 2 MG/ML
INJECTION INTRAMUSCULAR; INTRAVENOUS PRN
Status: DISCONTINUED | OUTPATIENT
Start: 2019-06-13 | End: 2019-06-13 | Stop reason: SDUPTHER

## 2019-06-13 RX ORDER — SODIUM CHLORIDE 0.9 % (FLUSH) 0.9 %
10 SYRINGE (ML) INJECTION PRN
Status: DISCONTINUED | OUTPATIENT
Start: 2019-06-13 | End: 2019-06-13 | Stop reason: HOSPADM

## 2019-06-13 RX ORDER — HYDROCODONE BITARTRATE AND ACETAMINOPHEN 5; 325 MG/1; MG/1
1 TABLET ORAL ONCE
Status: COMPLETED | OUTPATIENT
Start: 2019-06-13 | End: 2019-06-13

## 2019-06-13 RX ORDER — ROCURONIUM BROMIDE 10 MG/ML
INJECTION, SOLUTION INTRAVENOUS PRN
Status: DISCONTINUED | OUTPATIENT
Start: 2019-06-13 | End: 2019-06-13 | Stop reason: SDUPTHER

## 2019-06-13 RX ORDER — SODIUM CHLORIDE 9 MG/ML
INJECTION, SOLUTION INTRAVENOUS CONTINUOUS PRN
Status: DISCONTINUED | OUTPATIENT
Start: 2019-06-13 | End: 2019-06-13 | Stop reason: SDUPTHER

## 2019-06-13 RX ORDER — MEPERIDINE HYDROCHLORIDE 25 MG/ML
12.5 INJECTION INTRAMUSCULAR; INTRAVENOUS; SUBCUTANEOUS EVERY 5 MIN PRN
Status: DISCONTINUED | OUTPATIENT
Start: 2019-06-13 | End: 2019-06-13 | Stop reason: HOSPADM

## 2019-06-13 RX ORDER — MIDAZOLAM HYDROCHLORIDE 1 MG/ML
1 INJECTION INTRAMUSCULAR; INTRAVENOUS EVERY 10 MIN PRN
Status: DISCONTINUED | OUTPATIENT
Start: 2019-06-13 | End: 2019-06-13 | Stop reason: HOSPADM

## 2019-06-13 RX ORDER — GLYCOPYRROLATE 1 MG/5 ML
SYRINGE (ML) INTRAVENOUS PRN
Status: DISCONTINUED | OUTPATIENT
Start: 2019-06-13 | End: 2019-06-13 | Stop reason: SDUPTHER

## 2019-06-13 RX ORDER — ONDANSETRON 2 MG/ML
4 INJECTION INTRAMUSCULAR; INTRAVENOUS
Status: DISCONTINUED | OUTPATIENT
Start: 2019-06-13 | End: 2019-06-13 | Stop reason: HOSPADM

## 2019-06-13 RX ORDER — PROPOFOL 10 MG/ML
INJECTION, EMULSION INTRAVENOUS PRN
Status: DISCONTINUED | OUTPATIENT
Start: 2019-06-13 | End: 2019-06-13 | Stop reason: SDUPTHER

## 2019-06-13 RX ORDER — SODIUM CHLORIDE 9 MG/ML
INJECTION, SOLUTION INTRAVENOUS CONTINUOUS
Status: DISCONTINUED | OUTPATIENT
Start: 2019-06-13 | End: 2019-06-13 | Stop reason: HOSPADM

## 2019-06-13 RX ORDER — HYDROCODONE BITARTRATE AND ACETAMINOPHEN 5; 325 MG/1; MG/1
1 TABLET ORAL EVERY 6 HOURS PRN
Qty: 20 TABLET | Refills: 0 | Status: SHIPPED | OUTPATIENT
Start: 2019-06-13 | End: 2019-06-18

## 2019-06-13 RX ORDER — FENTANYL CITRATE 50 UG/ML
INJECTION, SOLUTION INTRAMUSCULAR; INTRAVENOUS PRN
Status: DISCONTINUED | OUTPATIENT
Start: 2019-06-13 | End: 2019-06-13 | Stop reason: SDUPTHER

## 2019-06-13 RX ORDER — FENTANYL CITRATE 50 UG/ML
50 INJECTION, SOLUTION INTRAMUSCULAR; INTRAVENOUS EVERY 10 MIN PRN
Status: DISCONTINUED | OUTPATIENT
Start: 2019-06-13 | End: 2019-06-13 | Stop reason: HOSPADM

## 2019-06-13 RX ORDER — ROPIVACAINE HYDROCHLORIDE 5 MG/ML
INJECTION, SOLUTION EPIDURAL; INFILTRATION; PERINEURAL
Status: COMPLETED
Start: 2019-06-13 | End: 2019-06-13

## 2019-06-13 RX ORDER — NEOSTIGMINE METHYLSULFATE 1 MG/ML
INJECTION, SOLUTION INTRAVENOUS PRN
Status: DISCONTINUED | OUTPATIENT
Start: 2019-06-13 | End: 2019-06-13 | Stop reason: SDUPTHER

## 2019-06-13 RX ORDER — SODIUM CHLORIDE 0.9 % (FLUSH) 0.9 %
10 SYRINGE (ML) INJECTION EVERY 12 HOURS SCHEDULED
Status: DISCONTINUED | OUTPATIENT
Start: 2019-06-13 | End: 2019-06-13 | Stop reason: HOSPADM

## 2019-06-13 RX ORDER — DEXAMETHASONE SODIUM PHOSPHATE 4 MG/ML
INJECTION, SOLUTION INTRA-ARTICULAR; INTRALESIONAL; INTRAMUSCULAR; INTRAVENOUS; SOFT TISSUE PRN
Status: DISCONTINUED | OUTPATIENT
Start: 2019-06-13 | End: 2019-06-13 | Stop reason: SDUPTHER

## 2019-06-13 RX ORDER — MIDAZOLAM HYDROCHLORIDE 1 MG/ML
INJECTION INTRAMUSCULAR; INTRAVENOUS
Status: COMPLETED
Start: 2019-06-13 | End: 2019-06-13

## 2019-06-13 RX ORDER — PROMETHAZINE HYDROCHLORIDE 25 MG/ML
6.25 INJECTION, SOLUTION INTRAMUSCULAR; INTRAVENOUS
Status: DISCONTINUED | OUTPATIENT
Start: 2019-06-13 | End: 2019-06-13 | Stop reason: HOSPADM

## 2019-06-13 RX ORDER — LIDOCAINE HYDROCHLORIDE 20 MG/ML
INJECTION, SOLUTION EPIDURAL; INFILTRATION; INTRACAUDAL; PERINEURAL PRN
Status: DISCONTINUED | OUTPATIENT
Start: 2019-06-13 | End: 2019-06-13 | Stop reason: SDUPTHER

## 2019-06-13 RX ORDER — OXYCODONE HYDROCHLORIDE AND ACETAMINOPHEN 5; 325 MG/1; MG/1
1 TABLET ORAL
Status: DISCONTINUED | OUTPATIENT
Start: 2019-06-13 | End: 2019-06-13

## 2019-06-13 RX ORDER — FENTANYL CITRATE 50 UG/ML
INJECTION, SOLUTION INTRAMUSCULAR; INTRAVENOUS
Status: COMPLETED
Start: 2019-06-13 | End: 2019-06-13

## 2019-06-13 RX ADMIN — DEXAMETHASONE SODIUM PHOSPHATE 10 MG: 4 INJECTION, SOLUTION INTRAMUSCULAR; INTRAVENOUS at 10:39

## 2019-06-13 RX ADMIN — SODIUM CHLORIDE: 9 INJECTION, SOLUTION INTRAVENOUS at 08:00

## 2019-06-13 RX ADMIN — FENTANYL CITRATE 50 MCG: 50 INJECTION, SOLUTION INTRAMUSCULAR; INTRAVENOUS at 08:48

## 2019-06-13 RX ADMIN — SODIUM CHLORIDE: 9 INJECTION, SOLUTION INTRAVENOUS at 10:20

## 2019-06-13 RX ADMIN — FENTANYL CITRATE 50 MCG: 50 INJECTION, SOLUTION INTRAMUSCULAR; INTRAVENOUS at 11:04

## 2019-06-13 RX ADMIN — SODIUM CHLORIDE: 9 INJECTION, SOLUTION INTRAVENOUS at 11:28

## 2019-06-13 RX ADMIN — FENTANYL CITRATE 50 MCG: 50 INJECTION INTRAMUSCULAR; INTRAVENOUS at 08:48

## 2019-06-13 RX ADMIN — ROPIVACAINE HYDROCHLORIDE 30 ML: 5 INJECTION, SOLUTION EPIDURAL; INFILTRATION; PERINEURAL at 08:55

## 2019-06-13 RX ADMIN — ROCURONIUM BROMIDE 40 MG: 10 SOLUTION INTRAVENOUS at 10:32

## 2019-06-13 RX ADMIN — LIDOCAINE HYDROCHLORIDE 60 MG: 20 INJECTION, SOLUTION EPIDURAL; INFILTRATION; INTRACAUDAL; PERINEURAL at 10:32

## 2019-06-13 RX ADMIN — Medication 3 MG: at 12:03

## 2019-06-13 RX ADMIN — PROPOFOL 200 MG: 10 INJECTION, EMULSION INTRAVENOUS at 10:32

## 2019-06-13 RX ADMIN — MIDAZOLAM HYDROCHLORIDE 1 MG: 1 INJECTION INTRAMUSCULAR; INTRAVENOUS at 08:48

## 2019-06-13 RX ADMIN — HYDROCODONE BITARTRATE AND ACETAMINOPHEN 1 TABLET: 5; 325 TABLET ORAL at 13:29

## 2019-06-13 RX ADMIN — ONDANSETRON HYDROCHLORIDE 4 MG: 2 INJECTION, SOLUTION INTRAMUSCULAR; INTRAVENOUS at 11:53

## 2019-06-13 RX ADMIN — MIDAZOLAM HYDROCHLORIDE 1 MG: 1 INJECTION, SOLUTION INTRAMUSCULAR; INTRAVENOUS at 08:48

## 2019-06-13 RX ADMIN — Medication 0.6 MG: at 12:03

## 2019-06-13 RX ADMIN — Medication 2 G: at 10:30

## 2019-06-13 ASSESSMENT — PULMONARY FUNCTION TESTS
PIF_VALUE: 19
PIF_VALUE: 20
PIF_VALUE: 22
PIF_VALUE: 22
PIF_VALUE: 20
PIF_VALUE: 2
PIF_VALUE: 22
PIF_VALUE: 19
PIF_VALUE: 25
PIF_VALUE: 19
PIF_VALUE: 18
PIF_VALUE: 1
PIF_VALUE: 3
PIF_VALUE: 19
PIF_VALUE: 19
PIF_VALUE: 20
PIF_VALUE: 0
PIF_VALUE: 18
PIF_VALUE: 3
PIF_VALUE: 17
PIF_VALUE: 23
PIF_VALUE: 2
PIF_VALUE: 20
PIF_VALUE: 18
PIF_VALUE: 19
PIF_VALUE: 21
PIF_VALUE: 19
PIF_VALUE: 20
PIF_VALUE: 3
PIF_VALUE: 3
PIF_VALUE: 19
PIF_VALUE: 21
PIF_VALUE: 22
PIF_VALUE: 19
PIF_VALUE: 18
PIF_VALUE: 23
PIF_VALUE: 19
PIF_VALUE: 19
PIF_VALUE: 3
PIF_VALUE: 19
PIF_VALUE: 15
PIF_VALUE: 19
PIF_VALUE: 20
PIF_VALUE: 18
PIF_VALUE: 19
PIF_VALUE: 20
PIF_VALUE: 19
PIF_VALUE: 1
PIF_VALUE: 21
PIF_VALUE: 19
PIF_VALUE: 22
PIF_VALUE: 17
PIF_VALUE: 20
PIF_VALUE: 21
PIF_VALUE: 17
PIF_VALUE: 2
PIF_VALUE: 19
PIF_VALUE: 22
PIF_VALUE: 19
PIF_VALUE: 21
PIF_VALUE: 17
PIF_VALUE: 2
PIF_VALUE: 1
PIF_VALUE: 23
PIF_VALUE: 18
PIF_VALUE: 19
PIF_VALUE: 1
PIF_VALUE: 22
PIF_VALUE: 19
PIF_VALUE: 0
PIF_VALUE: 19
PIF_VALUE: 18
PIF_VALUE: 19
PIF_VALUE: 18
PIF_VALUE: 3
PIF_VALUE: 19
PIF_VALUE: 18
PIF_VALUE: 19
PIF_VALUE: 17
PIF_VALUE: 19
PIF_VALUE: 18
PIF_VALUE: 4
PIF_VALUE: 20
PIF_VALUE: 22
PIF_VALUE: 20
PIF_VALUE: 17
PIF_VALUE: 5
PIF_VALUE: 20
PIF_VALUE: 2
PIF_VALUE: 1
PIF_VALUE: 19
PIF_VALUE: 20
PIF_VALUE: 22
PIF_VALUE: 20
PIF_VALUE: 20
PIF_VALUE: 21
PIF_VALUE: 22
PIF_VALUE: 0
PIF_VALUE: 35
PIF_VALUE: 1
PIF_VALUE: 19
PIF_VALUE: 20
PIF_VALUE: 19
PIF_VALUE: 0
PIF_VALUE: 19
PIF_VALUE: 20
PIF_VALUE: 20
PIF_VALUE: 19
PIF_VALUE: 19

## 2019-06-13 ASSESSMENT — PAIN SCALES - GENERAL
PAINLEVEL_OUTOF10: 7
PAINLEVEL_OUTOF10: 0

## 2019-06-13 ASSESSMENT — PAIN DESCRIPTION - DESCRIPTORS: DESCRIPTORS: DISCOMFORT;SORE

## 2019-06-13 ASSESSMENT — PAIN DESCRIPTION - LOCATION: LOCATION: SHOULDER

## 2019-06-13 ASSESSMENT — PAIN DESCRIPTION - PAIN TYPE: TYPE: SURGICAL PAIN

## 2019-06-13 ASSESSMENT — PAIN DESCRIPTION - ORIENTATION: ORIENTATION: RIGHT

## 2019-06-13 ASSESSMENT — PAIN - FUNCTIONAL ASSESSMENT: PAIN_FUNCTIONAL_ASSESSMENT: 0-10

## 2019-06-13 NOTE — PROGRESS NOTES
Assuming care of patient at this time  1305 [facility]. PAin controlled. Ready to transfer acre to stage 2 recovery.

## 2019-06-13 NOTE — ANESTHESIA POSTPROCEDURE EVALUATION
Department of Anesthesiology  Postprocedure Note    Patient: Rosalia Olszewski  MRN: [id number]  YOB: 1995  Date of evaluation: 6/13/2019  Time:  2:35 PM     Procedure Summary     Date:  06/13/19 Room / Location:  [facility] OR 02 / [facility] OR    Anesthesia Start:  1020 Anesthesia Stop:  6939    Procedure:  RIGHT SHOULDER ARTHROSCOPY LABRAL REPAIR  CAPSULORRHAPHY, SUBACROMIAL DECOMPRESSION (Right ) Diagnosis:  (RIGHT SHOULDER INSTABILITY)    Surgeon:  Fariba Kingston MD Responsible Provider:  Nikki Calhoun DO    Anesthesia Type:  general, regional ASA Status:  3          Anesthesia Type: general, regional    Pati Phase I: Pati Score: 9    Pati Phase II:      Last vitals: Reviewed and per EMR flowsheets.        Anesthesia Post Evaluation    Patient location during evaluation: PACU  Patient participation: complete - patient participated  Level of consciousness: awake and alert  Pain score: 1  Airway patency: patent  Nausea & Vomiting: no nausea and no vomiting  Complications: no  Cardiovascular status: hemodynamically stable  Respiratory status: acceptable  Hydration status: euvolemic

## 2019-06-13 NOTE — ANESTHESIA PRE PROCEDURE
Department of Anesthesiology  Preprocedure Note       Name:  Gloria Hansen   Age:  21 y.o.  :  1995                                          MRN:  [id number]         Date:  2019      Surgeon: Pinky Castro):  Carmina Reyna MD    Procedure: RIGHT SHOULDER ARTHROSCOPY LABRAL REPAIR WITH CAPSULORRHAPHY POSSIBLE REMPLISSAGE  ++ARTHREX++  ++ISB++ (Right )    Medications prior to admission:   Prior to Admission medications    Medication Sig Start Date End Date Taking? Authorizing Provider   HYDROcodone-acetaminophen (NORCO) 5-325 MG per tablet Take 1 tablet by mouth every 6 hours as needed for Pain for up to 5 days. 19 Yes Carmina Reyna MD   ketorolac (TORADOL) 10 MG tablet Take 10 mg by mouth every 6 hours as needed for Pain Instructed to take am of procedure if needed    Historical Provider, MD       Current medications:    Current Facility-Administered Medications   Medication Dose Route Frequency Provider Last Rate Last Dose    0.9 % sodium chloride infusion   Intravenous Continuous Carmina Reyna MD        sodium chloride flush 0.9 % injection 10 mL  10 mL Intravenous 2 times per day Carmina Reyna MD        sodium chloride flush 0.9 % injection 10 mL  10 mL Intravenous PRN Carmina Reyna MD        ceFAZolin (ANCEF) 2 g in dextrose 5 % 100 mL IVPB  2 g Intravenous On Call to MD Yeni        fentaNYL (SUBLIMAZE) 100 MCG/2ML injection             midazolam (VERSED) 2 MG/2ML injection             ropivacaine (NAROPIN) 0.5% injection                Allergies:     Allergies   Allergen Reactions    Depakote [Divalproex Sodium]      Seizure        Problem List:    Patient Active Problem List   Diagnosis Code    Schizophrenia ([facility] [address] F20.9       Past Medical History:        Diagnosis Date    Anxiety attack     Dislocation of shoulder     right    Schizophrenia [facility])        Past Surgical History:        Procedure Laterality Date    DENTAL SURGERY Social History:    Social History     Tobacco Use    Smoking status: Never Smoker    Smokeless tobacco: Never Used   Substance Use Topics    Alcohol use: No     Comment: denies at this time                                Counseling given: Not Answered      Vital Signs (Current):   Vitals:    06/12/19 0859 06/13/19 0739   BP:  (!) 140/76   Pulse:  50   Resp:  20   Temp:  97.7 °F (36.5 °C)   TempSrc:  Temporal   SpO2:  96%   Weight: 170 lb (77.1 kg) 170 lb (77.1 kg)   Height: 5' 9\" (1.753 m) 5' 9\" (1.753 m)                                              BP Readings from Last 3 Encounters:   06/13/19 (!) 140/76   06/05/19 (!) 142/81   05/06/19 131/73       NPO Status: Time of last liquid consumption: 2350                        Time of last solid consumption: 2350                        Date of last liquid consumption: 06/12/19                        Date of last solid food consumption: 06/12/19    BMI:   Wt Readings from Last 3 Encounters:   06/13/19 170 lb (77.1 kg)   06/05/19 170 lb (77.1 kg)   05/06/19 170 lb (77.1 kg)     Body mass index is 25.1 kg/m². CBC:   Lab Results   Component Value Date    WBC 6.0 02/01/2017    RBC 5.07 02/01/2017    HGB 13.2 02/01/2017    HCT 40.8 02/01/2017    MCV 80.5 02/01/2017    RDW 14.4 02/01/2017     02/01/2017       CMP:   Lab Results   Component Value Date     02/01/2017    K 3.9 02/01/2017     02/01/2017    CO2 23 02/01/2017    BUN 13 02/01/2017    CREATININE 1.3 02/01/2017    GFRAA >60 02/01/2017    LABGLOM >60 02/01/2017    GLUCOSE 106 02/01/2017    PROT 7.2 02/01/2017    CALCIUM 8.6 02/01/2017    BILITOT 0.3 02/01/2017    ALKPHOS 79 02/01/2017    AST 19 02/01/2017    ALT 14 02/01/2017       POC Tests: No results for input(s): POCGLU, POCNA, POCK, POCCL, POCBUN, POCHEMO, POCHCT in the last 72 hours.     Coags: No results found for: PROTIME, INR, APTT    HCG (If Applicable): No results found for: PREGTESTUR, PREGSERUM, HCG, HCGQUANT     ABGs: No results found for: PHART, PO2ART, HFO1AQE, LXM2PEI, BEART, C2RDFSRM     Type & Screen (If Applicable):  No results found for: LABABO, [address]    Anesthesia Evaluation  Patient summary reviewed and Nursing notes reviewed no history of anesthetic complications:   Airway: Mallampati: II  TM distance: >3 FB   Neck ROM: full  Mouth opening: > = 3 FB Dental:      Comment: Chipped upper front tooth    Pulmonary:Negative Pulmonary ROS breath sounds clear to auscultation                             Cardiovascular:Negative CV ROS            Rhythm: regular  Rate: normal                    Neuro/Psych:   (+) psychiatric history:             ROS comment: Schizophrenia GI/Hepatic/Renal: Neg GI/Hepatic/Renal ROS            Endo/Other:                      ROS comment: Rt. Shoulder instability Abdominal:           Vascular:                                        Anesthesia Plan      general and regional     ASA 3     (Discussed with Pt. And his wife R & B of Rt. Interscalene nerve block for postop pain and pt. Agrees to this.)        Anesthetic plan and risks discussed with patient and spouse. Plan discussed with CRNA. Justice Thomas DO   6/13/2019      Patient seen and examined, chart reviewed, agree with above findings. Anesthetic plan, risks, benefits, alternatives, and personnel involved discussed with patient and his wife. Patient verbalized an understanding and agreed to proceed. NPO status confirmed. Anesthetic plan discussed with care team members and agreed upon.     Justice Thomas DO   6/13/2019  8:15 AM

## 2019-06-13 NOTE — ANESTHESIA PROCEDURE NOTES
Peripheral Block    Patient location during procedure: procedure area  End time: 6/13/2019 8:50 AM  Staffing  Anesthesiologist: Raegan Thornton DO  Performed: anesthesiologist   Preanesthetic Checklist  Completed: patient identified, site marked, surgical consent, pre-op evaluation, timeout performed, IV checked, risks and benefits discussed, monitors and equipment checked, anesthesia consent given, oxygen available and patient being monitored  Peripheral Block  Patient position: supine  Prep: ChloraPrep  Patient monitoring: cardiac monitor, continuous pulse ox, frequent blood pressure checks and IV access  Block type: Brachial plexus  Laterality: right  Injection technique: single-shot  Procedures: ultrasound guided and nerve stimulator  Local infiltration: ropivacaine  Infiltration strength: 0.5 %  Dose: 30 mL  Interscalene  Provider prep: mask and sterile gloves  Local infiltration: ropivacaine  Needle  Needle type:  Other (Stimuplex)   Needle gauge: 22 G  Needle length: 5 cm  Needle localization: nerve stimulator and ultrasound guidance  Assessment  Injection assessment: negative aspiration for heme, no paresthesia on injection and local visualized surrounding nerve on ultrasound  Slow fractionated injection: yes  Hemodynamics: stable  Reason for block: post-op pain management and at surgeon's request

## 2019-06-13 NOTE — PROGRESS NOTES
Department of Orthopedic Surgery  Attending Pre-operative History and Physical        DIAGNOSIS:  Right shoulder recurrent instability     INDICATION:  Failed Conservative Management     PROCEDURE:  RIGHT SHOULDER ARTHROSCOPY LABRAL REPAIR WITH CAPSULORRHAPHY POSSIBLE REMPLISSAGE  ++ARTHREX++  ++ISB++    CHIEF COMPLAINT:  Right shoulder instability    History Obtained From:  patient    HISTORY OF PRESENT ILLNESS:      The patient is a 21 y.o. male with significant past medical history of right shoulder dislocation and resulting instability. He has had several dislocations in the past, most recently over 6 months ago. He was referred to me for assessment. He underwent MRI as well as CT scan. There did appear to be possibly a labral tissue as well as increased capsular volume anterior. He displayed anterior instability on exam.  Given his failure of conservative treatment he was offered arthroscopic stabilization. We discussed this would be in the form of labral repair and capsulorrhaphy with possible Remplissage. We discussed risks in detail. These risks include but are not limited to infection, neurovascular injury, postoperative stiffness, recurrent instability, need for other surgeries, unforeseen risks. He understands and would like to proceed    Past Medical History:        Diagnosis Date    Anxiety attack     Dislocation of shoulder     right    Schizophrenia ([facility] [address]        Past Surgical History:        Procedure Laterality Date    DENTAL SURGERY         Medications Prior to Admission:   Medications Prior to Admission: ketorolac (TORADOL) 10 MG tablet, Take 10 mg by mouth every 6 hours as needed for Pain Instructed to take am of procedure if needed    Allergies:  Depakote [divalproex sodium]    History of allergic reaction to anesthesia:  Never received anesthesia    Social History:   TOBACCO:   reports that he has never smoked.  He has never used smokeless tobacco.  ETOH:   reports that he does not with above specified procedure planned right shoulder arthroscopy, labral repair and capsulorrhaphy, possible Remplissage with anesthesia    2. Procedure options, risks and benefits reviewed with patient. Patient expresses understanding and has signed consent form to proceed. 3.  Patient and family were provided with pre-op and post-op instructions, prescription medications, and any other supplied needed post op (slings, braces, etc.)    Controlled Substances Monitoring:            Shayan Hdez MD  Orthopaedic Surgery   6/5/19      Update History & Physical    The patient's History and Physical was reviewed with the patient and there were no significant changes. I examined the patient and there were no significant changes from the previous History and Physical.    Plan: The risk, benefits, expected outcome, and alternative to the recommended procedure have been discussed with the patient. Patient understands and wants to proceed with the procedure.     Electronically signed by Feliberto Tam MD  on 6/13/2019 at 8:03 AM

## 2019-06-13 NOTE — OP NOTE
Tatar cuff was intact and there was no significant downsloping of the acromion or bone spur to necessitate acromioplasty    The camera was returned to the joint through the posterior portal.  Final pictures were taken. The scope was withdrawn and fluid removed via suction. The wounds were closed with buried 4-O nylon. The wounds were covered with steri strips, xeroform, 4x4, and tape. The shoulder was placed in a sling. The patient was awoken from anesthesia and transferred to the recovery room in stable condition. IMPLANTS:   Implant Name Type Inv. Item Serial No.  Lot No. LRB No. Used   IMPL ANCHOR BIOCOMPOSITE SUT KNOTLESS Fastener IMPL ANCHOR BIOCOMPOSITE SUT KNOTLESS  [employer] [id number] Right 2         ESTIMATED BLOOD LOSS:  5 mL    COMPLICATIONS: none    POST OPERATIVE PLAN: The patient will discharged home from PACU once stable. Follow up in office will be post operative day 1 or 2. Dressing will stay on and ice applied until follow up. The patient will remain in the sling.         Ezra Gaitan MD  Orthopaedic Surgery   6/13/19  12:11 PM

## 2019-06-14 ENCOUNTER — OFFICE VISIT (OUTPATIENT)
Dept: ORTHOPEDIC SURGERY | Age: 24
End: 2019-06-14

## 2019-06-14 VITALS — DIASTOLIC BLOOD PRESSURE: 55 MMHG | SYSTOLIC BLOOD PRESSURE: 127 MMHG | HEART RATE: 52 BPM | TEMPERATURE: 98.4 F

## 2019-06-14 DIAGNOSIS — M25.311 SHOULDER INSTABILITY, RIGHT: Primary | ICD-10-CM

## 2019-06-14 PROCEDURE — 99024 POSTOP FOLLOW-UP VISIT: CPT | Performed by: ORTHOPAEDIC SURGERY

## 2019-06-14 NOTE — PROGRESS NOTES
Post Operative Visit      Surgery: Right shoulder arthroscopy, capsulorrhaphy, subacromial bursectomy    Date: 6/13/19    Subjective:    Kasia Castillo is here for follow up visit s/p above procedure. He is doing well. He has been compliant with sling wear. His pain is controlled    Controlled Substances Monitoring:        Physical Exam:    Height: 5' 9\" (1.753 m), Weight: 170 lb (77.1 kg), BP: 134/72    On exam, incisions are intact. There is expected level of swelling. He has intact sensation of the deltoid into the hand      Imaging: X-rays obtained today show no acute abnormality. This includes 2 views of the shoulder. Impression: Normal shoulder x-ray    Assessment and Plan: That is post right shoulder arthroscopy and capsulorrhaphy as well as subacromial bursectomy  He is doing well. We discussed precautions. He will stay in the sling at all times. He can come out to shower only.   We will see him back in 1 week for suture removal.    Sarah Brooks MD  Orthopaedic Surgery   6/14/19  8:46 AM

## 2019-06-17 ENCOUNTER — HOSPITAL ENCOUNTER (EMERGENCY)
Age: 24
Discharge: HOME | End: 2019-06-17
Attending: EMERGENCY MEDICINE
Payer: MEDICAID

## 2019-06-17 ENCOUNTER — APPOINTMENT (OUTPATIENT)
Dept: GENERAL RADIOLOGY | Age: 24
End: 2019-06-17
Payer: MEDICAID

## 2019-06-17 VITALS
DIASTOLIC BLOOD PRESSURE: 97 MMHG | TEMPERATURE: 98.1 F | HEART RATE: 77 BPM | SYSTOLIC BLOOD PRESSURE: 153 MMHG | OXYGEN SATURATION: 99 % | HEIGHT: 69 IN | BODY MASS INDEX: 25.18 KG/M2 | WEIGHT: 170 LBS | RESPIRATION RATE: 16 BRPM

## 2019-06-17 DIAGNOSIS — M25.511 ACUTE PAIN OF RIGHT SHOULDER: Primary | ICD-10-CM

## 2019-06-17 PROCEDURE — 73030 X-RAY EXAM OF SHOULDER: CPT

## 2019-06-17 PROCEDURE — 99284 EMERGENCY DEPT VISIT MOD MDM: CPT

## 2019-06-17 ASSESSMENT — PAIN DESCRIPTION - DESCRIPTORS: DESCRIPTORS: ACHING

## 2019-06-17 ASSESSMENT — PAIN DESCRIPTION - FREQUENCY: FREQUENCY: CONTINUOUS

## 2019-06-17 ASSESSMENT — PAIN DESCRIPTION - LOCATION: LOCATION: SHOULDER

## 2019-06-17 ASSESSMENT — PAIN SCALES - GENERAL: PAINLEVEL_OUTOF10: 8

## 2019-06-17 ASSESSMENT — PAIN DESCRIPTION - PROGRESSION: CLINICAL_PROGRESSION: NOT CHANGED

## 2019-06-17 ASSESSMENT — PAIN DESCRIPTION - PAIN TYPE: TYPE: ACUTE PAIN

## 2019-06-17 NOTE — ED NOTES
FIRST PROVIDER CONTACT ASSESSMENT NOTE        Department of Emergency Medicine   6/17/19  7:05 PM    Chief Complaint: Shoulder Pain (s/p right shoulder surgery last thursday increased pain today )      History of Present Illness:    Iveth Sahu is a 21 y.o. male who presents to the ED by private car for Right soulder surgery with Dr. Charly Hess and having increased pain out of meds. No new injury. When in ambulance feeling increased pain and nausea. 8/10 pain  1500 took 1 Norco not much help    Focused Screening Exam:  Constitutional:  Alert, appears stated age and is in no distress.       *ALLERGIES*     Depakote [divalproex sodium]     Vitals:    06/17/19 1904   BP: (!) 153/97   Pulse: 70   Resp: 16   Temp: 97.3 °F (36.3 °C)   SpO2: 99%   Weight: 170 lb (77.1 kg)   Height: 5' 9\" (1.753 m)         Initial Plan of Care:  Initiate Treatment-Testing, Proceed toTreatment Area When Bed Available for ED Attending/MLP to Continue Care    -----------------END OF FIRST PROVIDER CONTACT ASSESSMENT NOTE--------------  Electronically signed by ADRIA Benítez CNP   DD: 6/17/19       ADRIA Benítez CNP  06/17/19 1907

## 2019-06-18 DIAGNOSIS — M25.311 SHOULDER INSTABILITY, RIGHT: Primary | ICD-10-CM

## 2019-06-18 NOTE — ED NOTES
Pt alert and oriented x4. Speech clear. Respirations easy/unlabored. Skin warm/dry. Appropriate color. No signs of acute distress noted. Pt/family teaching provided; verbalized understanding. Pt stable for discharge.      Kirt Heimlich, RN  06/17/19 7870

## 2019-06-18 NOTE — ED PROVIDER NOTES
HPI:  6/17/19, Time: 8:49 PM         Dong Ji is a 21 y.o. male presenting to the ED for shoulder pain. Patient is s/p right shoulder arthroscopy, capsulorrhaphy, and subacromial bursectomy on 6/13/19 by Dr. Selin Roman. States that he was not supposed to go out today, but had an appointment. He was riding in the car, and each bump made his shoulder hurt worse. He states that then due to the pain he was getting nauseous and dizzy. He states that he fell down, but he did not strike his shoulder, hit his head, or lose consciousness. Denies any redness or warmth to his shoulder, discharge from his incision, or fevers. He has been taking Norco at for his pain with improvement. Review of Systems:   Pertinent positives and negatives are stated within HPI, all other systems reviewed and are negative.          --------------------------------------------- PAST HISTORY ---------------------------------------------  Past Medical History:  has a past medical history of Anxiety attack, Dislocation of shoulder, and [facility] Past Surgical History:  has a past surgical history that includes Dental surgery and Shoulder arthroscopy (Right, 6/13/2019). Social History:  reports that he has never smoked. He has never used smokeless tobacco. He reports that he does not drink alcohol or use drugs. Family History: family history is not on file. The patients home medications have been reviewed. Allergies: Depakote [divalproex sodium]    -------------------------------------------------- RESULTS -------------------------------------------------  All laboratory and radiology results have been personally reviewed by myself   LABS:  No results found for this visit on 06/17/19. RADIOLOGY:  Interpreted by Radiologist.  XR SHOULDER RIGHT (MIN 2 VIEWS)   Final Result      No evidence of fracture or dislocation of the shoulder.                                   ------------------------- NURSING NOTES AND VITALS REVIEWED ---------------------------   The nursing notes within the ED encounter and vital signs as below have been reviewed. BP (!) 153/97   Pulse 70   Temp 97.3 °F (36.3 °C)   Resp 16   Ht 5' 9\" (1.753 m)   Wt 170 lb (77.1 kg)   SpO2 99%   BMI 25.10 kg/m²   Oxygen Saturation Interpretation: Normal      ---------------------------------------------------PHYSICAL EXAM--------------------------------------      Constitutional/General: Alert and oriented x3, well appearing, non toxic in NAD  Head: Normocephalic and atraumatic  Eyes: PERRL, EOMI  Mouth: Oropharynx clear, handling secretions, no trismus  Neck: Supple, full ROM,   Pulmonary: Lungs clear to auscultation bilaterally, no wheezes, rales, or rhonchi. Not in respiratory distress  Cardiovascular:  Regular rate and rhythm, no murmurs, gallops, or rubs. 2+ distal pulses  Abdomen: Soft, non tender, non distended,   Extremities: Moves all extremities x 4. Warm and well perfused. Right shoulder-incisions clean and intact, no discharge, no warmth or erythema, immobilized  Skin: warm and dry without rash  Neurologic: GCS 15,  Psych: Normal Affect      ------------------------------ ED COURSE/MEDICAL DECISION MAKING----------------------  Medications - No data to display      Medical Decision Making/ED COURSE:   Patient is a 19-year-old male s/p right shoulder arthroscopy, capsulorrhaphy, and subacromial bursectomy on 6/13/19 by Dr. Priscella Kawasaki presenting with shoulder pain. No evidence of septic joint. Xray unremarkable. ED Course as of Jun 17 2234 Mon Jun 17, 2019   2210 Spoke with ortho resident. Will evaluate. [JA]   2228 Ortho states okay for discharge home. [JA]   3916 Patient refusing EKG ordered for dizziness. [JA]      ED Course User Index  [JA] Ritesh Hinds MD       Counseling:    The emergency provider has spoken with the patient and discussed todays results, in addition to providing specific details for the plan of care and counseling regarding the diagnosis and prognosis. Questions are answered at this time and they are agreeable with the plan.      --------------------------------- IMPRESSION AND DISPOSITION ---------------------------------    IMPRESSION  1. Acute pain of right shoulder        DISPOSITION  Disposition: Discharge to home  Patient condition is stable      NOTE: This report was transcribed using voice recognition software.  Every effort was made to ensure accuracy; however, inadvertent computerized transcription errors may be present       Tk Rodriguez MD  06/17/19 9547

## 2019-06-18 NOTE — ED NOTES
Pt refused EKG, stating that \"the surgeon told me that I am being discharged and that the dizziness was probably from the pain medication. \"     Ranjan Ortiz RN  06/17/19 3360

## 2019-06-18 NOTE — ED NOTES
Pt states that \"I walked outside for a moment when I was called to go to the back, I did not elope. \"     Lorelei Alford RN  06/17/19 2046

## 2019-06-18 NOTE — CONSULTS
Department of Orthopedic Surgery  Resident Consult Note          Reason for Consult:  Post op issue    HISTORY OF PRESENT ILLNESS:       Patient is a 21 y.o. male who presents with dizziness and pain to the right shoulder. Patient underwent arthroscopy of the right shoulder with capsulorrhaphy and subacromial bursectomy. He states that he became dizzy on 2 occasions today after taking his Norco and over exerting himself. He states he also had some increased pain to the right shoulder. He denies any trauma or other injury. He has been compliant with wearing his sling at all times. Denies numbness/tingling/paresthesias. Denies any other orthopedic complaints at this time. Past Medical History:        Diagnosis Date    Anxiety attack     Dislocation of shoulder     right    Schizophren[facility])      Past Surgical History:        Procedure Laterality Date    DENTAL SURGERY      SHOULDER ARTHROSCOPY Right 6/13/2019    RIGHT SHOULDER ARTHROSCOPY LABRAL REPAIR  CAPSULORRHAPHY, SUBACROMIAL DECOMPRESSION performed by Alcide Bosworth, MD at [facility] OR     Current Medications:   No current facility-administered medications for this encounter. Allergies:  Depakote [divalproex sodium]    Social History:   TOBACCO:   reports that he has never smoked. He has never used smokeless tobacco.  ETOH:   reports that he does not drink alcohol. DRUGS:   reports that he does not use drugs. ACTIVITIES OF DAILY LIVING:    OCCUPATION:    Family History:   No family history on file.     REVIEW OF SYSTEMS:  CONSTITUTIONAL:  negative for  fevers, chills  EYES:  negative for blurred vision, visual disturbance  HEENT:  negative for  hearing loss, voice change  RESPIRATORY:  negative for  dyspnea, wheezing  CARDIOVASCULAR:  negative for  chest pain, palpitations  GASTROINTESTINAL:  negative for nausea, vomiting  GENITOURINARY:  negative for frequency, urinary incontinence  HEMATOLOGIC/LYMPHATIC:  negative for bleeding and petechiae  MUSCULOSKELETAL:  positive for  Pain to the right shoulder  NEUROLOGICAL:  negative for headaches, dizziness  BEHAVIOR/PSYCH:  negative for increased agitation and anxiety    PHYSICAL EXAM:    VITALS:  BP (!) 153/97   Pulse 70   Temp 97.3 °F (36.3 °C)   Resp 16   Ht 5' 9\" (1.753 m)   Wt 170 lb (77.1 kg)   SpO2 99%   BMI 25.10 kg/m²   CONSTITUTIONAL:  awake, alert, cooperative, no apparent distress, and appears stated age  MUSCULOSKELETAL:  Right upper Extremity:  · Patient wearing shoulder immobilizer appropriately  · +TTP about the shoulder  · Incisions with nylon sutures intact, no erythema or drainage  · Demonstrates motor function at hand to AIN/PIN/Ulna/Median/Radial nerves  · SILT median/radial/ulnar and axillary nerve distributions  · Radial pulse 2+      DATA:    CBC:   Lab Results   Component Value Date    WBC 6.0 02/01/2017    RBC 5.07 02/01/2017    HGB 13.2 02/01/2017    HCT 40.8 02/01/2017    MCV 80.5 02/01/2017    MCH 26.0 02/01/2017    MCHC 32.4 02/01/2017    RDW 14.4 02/01/2017     02/01/2017    MPV 11.4 02/01/2017     PT/INR:  No results found for: PROTIME, INR    Radiology Review:  XR shoulder right:  Demonstrates no acute fractures or dislocations    IMPRESSION:  · Post operative pain     PLAN:  · NWB RUE  · Remain in shoulder immobilizer  · Taken Norco as prescribed  · Instructed to rest and take caution while still on Norco  · Ice as needed to shoulder  · Follow up with Dr. Bernie Pearl as previously planned as outpatient  · Discuss with Dr. Bernie Pearl

## 2019-06-19 RX ORDER — HYDROCODONE BITARTRATE AND ACETAMINOPHEN 5; 325 MG/1; MG/1
1 TABLET ORAL EVERY 6 HOURS PRN
Qty: 20 TABLET | Refills: 0 | Status: SHIPPED | OUTPATIENT
Start: 2019-06-19 | End: 2019-06-24

## 2019-06-21 ENCOUNTER — OFFICE VISIT (OUTPATIENT)
Dept: ORTHOPEDIC SURGERY | Age: 24
End: 2019-06-21

## 2019-06-21 VITALS
WEIGHT: 170 LBS | BODY MASS INDEX: 25.18 KG/M2 | SYSTOLIC BLOOD PRESSURE: 125 MMHG | HEART RATE: 60 BPM | DIASTOLIC BLOOD PRESSURE: 83 MMHG | HEIGHT: 69 IN

## 2019-06-21 DIAGNOSIS — Z48.02 VISIT FOR SUTURE REMOVAL: ICD-10-CM

## 2019-06-21 DIAGNOSIS — Z98.890 S/P ARTHROSCOPY OF RIGHT SHOULDER: Primary | ICD-10-CM

## 2019-06-21 DIAGNOSIS — M25.311 SHOULDER INSTABILITY, RIGHT: ICD-10-CM

## 2019-06-21 PROCEDURE — 99024 POSTOP FOLLOW-UP VISIT: CPT | Performed by: ORTHOPAEDIC SURGERY

## 2019-06-21 NOTE — PROGRESS NOTES
Sutures removed from right shoulder s/p Right shoulder arthroscopy, capsulorrhaphy, subacromial bursectomy on 6/13/19

## 2019-06-21 NOTE — LETTER
[address] [facility] [address]  [address] [place] [address]  Phone: [phone]  Fax: [phone]    Hunter Monte MD        June 21, 2019     Patient: Preeti Bai   YOB: 1995   Date of Visit: 6/21/2019       To Whom It May Concern: It is my medical opinion that Ada Reynolds May return to work with the following restrictions: No lifting with his right surgical arm. If you have any questions or concerns, please don't hesitate to call.     Sincerely,          Hunter Monte MD

## 2019-06-21 NOTE — PROGRESS NOTES
Post Operative Visit     Surgery: Right shoulder arthroscopy, capsulorrhaphy, subacromial bursectomy    Date: 6/13/19    Subjective:    Dong Ji is here for follow up visit s/p above procedure. He is doing well. He has been compliant with restrictions. He has been in his sling    Controlled Substances Monitoring:        Physical Exam:    Height: 5' 9\" (1.753 m), Weight: 170 lb (77.1 kg), BP: 125/83    On exam, incisions are intact. There is minimal swelling. There is sensation over the deltoid and throughout the arm. He is able to move his hand elbow and wrist without pain. Imaging: No new imaging today. Previous images reviewed    Assessment and Plan: 1 week status post right shoulder arthroscopy, capsulorrhaphy  He is doing well. We discussed precautions. He will remain in the sling except for pendulums and passive forward elevation to 90 degrees. Follow-up in 5 weeks at which time we will discontinue sling and start physical therapy.     Milo Meier MD  Orthopaedic Surgery   6/21/19  11:17 AM

## 2019-07-24 ENCOUNTER — OFFICE VISIT (OUTPATIENT)
Dept: ORTHOPEDIC SURGERY | Age: 24
End: 2019-07-24

## 2019-07-24 VITALS
BODY MASS INDEX: 25.18 KG/M2 | HEIGHT: 69 IN | HEART RATE: 51 BPM | WEIGHT: 170 LBS | SYSTOLIC BLOOD PRESSURE: 111 MMHG | DIASTOLIC BLOOD PRESSURE: 56 MMHG

## 2019-07-24 DIAGNOSIS — M25.311 SHOULDER INSTABILITY, RIGHT: ICD-10-CM

## 2019-07-24 DIAGNOSIS — Z98.890 S/P ARTHROSCOPY OF RIGHT SHOULDER: Primary | ICD-10-CM

## 2019-07-24 PROCEDURE — 99024 POSTOP FOLLOW-UP VISIT: CPT | Performed by: ORTHOPAEDIC SURGERY

## 2019-07-30 ENCOUNTER — EVALUATION (OUTPATIENT)
Dept: PHYSICAL THERAPY | Age: 24
End: 2019-07-30
Payer: MEDICAID

## 2019-07-30 DIAGNOSIS — M25.311 INSTABILITY OF RIGHT SHOULDER JOINT: Primary | ICD-10-CM

## 2019-07-30 PROCEDURE — 97110 THERAPEUTIC EXERCISES: CPT | Performed by: PHYSICAL THERAPIST

## 2019-07-30 PROCEDURE — 97161 PT EVAL LOW COMPLEX 20 MIN: CPT | Performed by: PHYSICAL THERAPIST

## 2019-07-30 PROCEDURE — 97140 MANUAL THERAPY 1/> REGIONS: CPT | Performed by: PHYSICAL THERAPIST

## 2019-07-30 NOTE — PROGRESS NOTES
[address] [facility]   Phone: [phone]   Fax: [phone]           Date:  2019   Patient: Yoli Sharif  : 1995  MRN: [id number]  Referring Provider: Regino White MD  [facility]     Medical Diagnosis:     G55.812 (ICD-10-CM) - S/P arthroscopy of right shoulder   M25.311 (ICD-10-CM) - Shoulder instability, right        SUBJECTIVE:     Onset date: 19     Onset[de-identified] Sudden onset    Mechanism of Injury / History: He had a Right shoulder arthroscopy, capsulorrhaphy, subacromial bursectomy on 19.  Pt reports that he has been noncompliant c protocol. He states he has been lifting heavy boxes at work and performing reaching activities against surgical protocol. He currently works as a . He reports that this was a chronic instability issue that he has had for years. Patient is right handed. Previous PT: none    Medical Management for Current Problem: surgery    Chief complaint: pain, weakness, inability to use arm, limited ability to lift/carry/handle material, limited ability to complete ADLs and IADLs and limited ability to complete home/outdoor chores/tasks    Behavior: condition is getting better    Pain: improving  Current: 3/10     Best: 0/10     Worst:9/10    Aggravated by: reaching overhead, reaching out, lifting    Relieved by: rest    Symptom Type/Quality: sharp, aching, throbbing  Location[de-identified] global        Imaging results: No results found. Past Medical History:  Past Medical History:   Diagnosis Date    Anxiety attack     Dislocation of shoulder     right    Schizophrenia [facility])      Past Surgical History:   Procedure Laterality Date    DENTAL SURGERY      SHOULDER ARTHROSCOPY Right 2019    RIGHT SHOULDER ARTHROSCOPY LABRAL REPAIR  CAPSULORRHAPHY, SUBACROMIAL DECOMPRESSION performed by Regino White MD at [facility] OR       Medications:   No current outpatient medications on file.      No current -  [] Painful Arc []+ / [] -  [] Shelli Coad []+ / [] -  [] Belly Press/ lift off[]+ / [] -  [] Other: []+ / [] -         ASSESSMENT     Outcome Measure:   QuickDASH (Disorders of the Arm, Shoulder, and Hand) 19/44    Problems:    Pain reported 2-9/10   ROM decreased   Strength decreased   Decreased functional ability with ADLs, use of right upper extremity, driving, lifting and carrying    [x] There are no barriers affecting plan of care or recovery    [] Barriers to this patient's plan of care or recovery include.     Domestic Concerns:  [x] No  [] Yes:    Short Term goals (3 weeks)   Decrease reported pain to 5/10 at worst   Increase PROM to full, AROM 90*flexion, 90* scaption, 70/70 ER/IR   Increase Strength to 3/5 R UE    Able to perform/complete the following functions/tasks: reach to shoulder height 10x s pain, drive s limitation, compliance c post op protocol   QuickDASH (Disorders of the Arm, Shoulder, and Hand) 15/44    Long Term goals (6 weeks)   Decrease reported pain to 0/10   Increase AROM equal to L   Increase Strength to 4/5    Able to perform/complete the following functions/tasks: Reach OH 10x s pain, reach behind back s pain, lift 30 lbs from floor to waist s pain, lift 10 lbs OH 10x   QuickDASH 5/44   Independent with Home Exercise Programs    Rehab Potential: [x] Good  [] Fair  [] Poor    PLAN       Treatment Plan:   [x] Therapeutic Exercise  [x] Therapeutic Activity  [x] Neuromuscular Re-education   [] Gait Training  [] Balance Training  [x] Aerobic conditioning  [x] Manual Therapy  [x] Massage/Fascial release   [] Work/Sport specific activities    [] Pain Neuroscience [x] Cold/hotpack  [x] Vasocompression  [x] Electrical Stimulation  [] Lumbar/Cervical Traction  [x] Ultrasound   [] Iontophoresis: 4 mg/mL Dexamethasone Sodium Phosphate 40-80 mAmin        [x] Instruction in HEP      []  Medication allergies reviewed for use of Dexamethasone Sodium Phosphate 4mg/ml  with iontophoresis

## 2019-07-31 ENCOUNTER — TREATMENT (OUTPATIENT)
Dept: PHYSICAL THERAPY | Age: 24
End: 2019-07-31
Payer: MEDICAID

## 2019-07-31 DIAGNOSIS — M25.311 INSTABILITY OF RIGHT SHOULDER JOINT: Primary | ICD-10-CM

## 2019-07-31 PROCEDURE — 97110 THERAPEUTIC EXERCISES: CPT

## 2019-07-31 PROCEDURE — 97150 GROUP THERAPEUTIC PROCEDURES: CPT

## 2019-07-31 PROCEDURE — 97140 MANUAL THERAPY 1/> REGIONS: CPT

## 2019-08-07 ENCOUNTER — TREATMENT (OUTPATIENT)
Dept: PHYSICAL THERAPY | Age: 24
End: 2019-08-07
Payer: MEDICAID

## 2019-08-07 DIAGNOSIS — M25.311 INSTABILITY OF RIGHT SHOULDER JOINT: Primary | ICD-10-CM

## 2019-08-07 PROCEDURE — 97110 THERAPEUTIC EXERCISES: CPT | Performed by: PHYSICAL THERAPIST

## 2019-08-07 PROCEDURE — 97150 GROUP THERAPEUTIC PROCEDURES: CPT | Performed by: PHYSICAL THERAPIST

## 2019-09-05 ENCOUNTER — TREATMENT (OUTPATIENT)
Dept: PHYSICAL THERAPY | Age: 24
End: 2019-09-05
Payer: MEDICAID

## 2019-09-05 ENCOUNTER — HOSPITAL ENCOUNTER (EMERGENCY)
Age: 24
Discharge: HOME | End: 2019-09-05
Attending: EMERGENCY MEDICINE
Payer: MEDICAID

## 2019-09-05 VITALS
SYSTOLIC BLOOD PRESSURE: 145 MMHG | HEIGHT: 69 IN | RESPIRATION RATE: 20 BRPM | DIASTOLIC BLOOD PRESSURE: 81 MMHG | OXYGEN SATURATION: 99 % | HEART RATE: 65 BPM | TEMPERATURE: 97.6 F | BODY MASS INDEX: 25.18 KG/M2 | WEIGHT: 170 LBS

## 2019-09-05 DIAGNOSIS — M25.311 INSTABILITY OF RIGHT SHOULDER JOINT: Primary | ICD-10-CM

## 2019-09-05 DIAGNOSIS — M25.511 ACUTE PAIN OF RIGHT SHOULDER: Primary | ICD-10-CM

## 2019-09-05 PROCEDURE — 99282 EMERGENCY DEPT VISIT SF MDM: CPT

## 2019-09-05 PROCEDURE — 97110 THERAPEUTIC EXERCISES: CPT | Performed by: PHYSICAL THERAPIST

## 2019-09-05 PROCEDURE — 97164 PT RE-EVAL EST PLAN CARE: CPT | Performed by: PHYSICAL THERAPIST

## 2019-09-05 ASSESSMENT — PAIN DESCRIPTION - ORIENTATION: ORIENTATION: RIGHT

## 2019-09-05 ASSESSMENT — PAIN DESCRIPTION - LOCATION: LOCATION: SHOULDER

## 2019-09-05 ASSESSMENT — PAIN SCALES - GENERAL: PAINLEVEL_OUTOF10: 8

## 2019-09-05 ASSESSMENT — PAIN DESCRIPTION - PAIN TYPE: TYPE: ACUTE PAIN;SURGICAL PAIN

## 2019-09-05 NOTE — ED PROVIDER NOTES
HPI:  9/5/19,   Time: 5:31 AM       Chandra Grady is a 21 y.o. male presenting to the ED for shoulder pain, beginning 2 days ago. The complaint has been persistent, mild in severity, and worsened by movement of right shoulder. Patient states that he recently had rotator cuff repair several months ago. He states that he went through physical therapy for a month but over the last month he was unable to go. He states that over the last 2 days he has been had increasing discomfort to his right shoulder. He states that he has been lifting heavy objects at work and wanted to get his shoulder checked out prior to beginning physical therapy this morning. Review of Systems:   Pertinent positives and negatives are stated within HPI, all other systems reviewed and are negative.          --------------------------------------------- PAST HISTORY ---------------------------------------------  Past Medical History:  has a past medical history of Anxiety attack, Dislocation of shoulder, and Schizophrenia ([facility] [address] Past Surgical History:  has a past surgical history that includes Dental surgery and Shoulder arthroscopy (Right, 6/13/2019). Social History:  reports that he has never smoked. He has never used smokeless tobacco. He reports that he does not drink alcohol or use drugs. Family History: family history is not on file. The patients home medications have been reviewed.     Allergies: Depakote [divalproex sodium]        ---------------------------------------------------PHYSICAL EXAM--------------------------------------    Constitutional/General: Alert and oriented x3, well appearing, non toxic in NAD  Head: Normocephalic and atraumatic  Eyes: PERRL, EOMI, conjunctive normal, sclera non icteric  Mouth: Oropharynx clear, handling secretions, no trismus, no asymmetry of the posterior oropharynx or uvular edema  Neck: Supple, full ROM, non tender to palpation in the midline, no stridor, no crepitus, no meningeal signs  Respiratory: Lungs clear to auscultation bilaterally, no wheezes, rales, or rhonchi. Not in respiratory distress  Cardiovascular:  Regular rate. Regular rhythm. No murmurs, gallops, or rubs. 2+ distal pulses  Chest: No chest wall tenderness  GI:  Abdomen Soft, Non tender, Non distended. +BS. No organomegaly, no palpable masses,  No rebound, guarding, or rigidity. Musculoskeletal: Moves all extremities x 4. Warm and well perfused, no clubbing, cyanosis, or edema. Capillary refill <3 seconds. Full range of motion noted to all 4 extremities. Sensation and muscle strength intact to right upper extremity. 2+ pulses. Patient has full range of motion. Positive Neer's test.  Integument: skin warm and dry. No rashes. Lymphatic: no lymphadenopathy noted  Neurologic: GCS 15, no focal deficits, symmetric strength 5/5 in the upper and lower extremities bilaterally  Psychiatric: Normal Affect    -------------------------------------------------- RESULTS -------------------------------------------------  I have personally reviewed all laboratory and imaging results for this patient. Results are listed below. LABS:  No results found for this visit on 09/05/19. RADIOLOGY:  Interpreted by Radiologist.  No orders to display             ------------------------- NURSING NOTES AND VITALS REVIEWED ---------------------------   The nursing notes within the ED encounter and vital signs as below have been reviewed by myself. BP (!) 145/81   Pulse 65   Temp 97.6 °F (36.4 °C)   Resp 20   Ht 5' 9\" (1.753 m)   Wt 170 lb (77.1 kg)   SpO2 99%   BMI 25.10 kg/m²   Oxygen Saturation Interpretation: Normal    The patients available past medical records and past encounters were reviewed. ------------------------------ ED COURSE/MEDICAL DECISION MAKING----------------------  Medications - No data to display      ED COURSE:       Medical Decision Making:     This is a 48-year-old male presented to the ED for shoulder pain. On examination the patient has no tenderness to palpation. Full range of motion noted. Sensation muscle strength intact. Patient does have mild tenderness with a Neer's test.  Otherwise no gross abnormalities. The patient will be treated conservatively. He will continue physical therapy. Return precautions given. Patient agrees with plan. This patient's ED course included: a personal history and physicial examination    This patient has remained hemodynamically stable during their ED course. Re-Evaluations:             Re-evaluation. Patients symptoms show no change    Re-examination  9/5/19         Counseling: The emergency provider has spoken with the patient and discussed todays results, in addition to providing specific details for the plan of care and counseling regarding the diagnosis and prognosis. Questions are answered at this time and they are agreeable with the plan.       --------------------------------- IMPRESSION AND DISPOSITION ---------------------------------    IMPRESSION  1. Acute pain of right shoulder        DISPOSITION  Disposition: Discharge to home  Patient condition is stable    NOTE: This report was transcribed using voice recognition software.  Every effort was made to ensure accuracy; however, inadvertent computerized transcription errors may be present     Deshawn Trotter DO  09/06/19 2004

## 2019-09-05 NOTE — PROGRESS NOTES
[address] [facility]   Phone: [phone]   Fax: [phone]        Date: 2019  Patient Name: Marie Burk  : 1995              MRN: [id number]  DOInjury: years  DOSx: 19   Referring Provider: Leonid Rivero MD  [address] [facility]                                  Medical Diagnosis:      S31.635 (ICD-10-CM) - S/P arthroscopy of right shoulder   M25.311 (ICD-10-CM) - Shoulder instability, right      Right shoulder arthroscopy, capsulorrhaphy, subacromial bursectomy 19     Outcome Measure: QuickDASH 95/    CERTIFICATION PERIOD:  19 to 19    ATTENDANCE:  Patient has attended 3 of 18 scheduled treatments from 19  to 19. TREATMENTS RECEIVED:  Manual, therapeutic exercise, vaso, CP    INITIAL STATUS:  Observations: normal orthopedic exam, well nourished male, normal affect     Inspection: normal orthopedic exam, anterior shifted shoulder c slight winging on R. Incision: edges approximated, no purulent drainage, no redness, no /swelling, no tenderness and not hot to touch.                                                       Palpation: Tender to palpation globally, slight anterior seating of humeral head in glenoid.      Joint/Motion:  Right Shoulder:  AROM: NT d/t post op protocol  PROM: 110° Forward elevation, 100 scpation 10° ER,  30° IR     Left Shoulder:  AROM: 165° Forward elevation, 160* abduction,  90° ER,  IR to 70  PROM: 180° Forward elevation,  90° ER , 90° IR     Strength:  Right Shoulder: NT d/t post op protocol     Left Shoulder: Flexion 4/5,  Abduction 4/5, ER 4/5, IR 4/5         Special Tests/Functional Screens:  NT d/t post op   [] Jackelin-Jonathan []+ / [] -  [] Forest's []+ / [] -   [] AC Sheer []+ / [] -    [] GH drawer []+ / [] -    [] Bicep Load []+ / [] -   [] Crank []+ / [] -  [] Delight Little Browning []+ / [] -   [] Mildredlet Stai []+ / [] -  [] Nemukesh's []+ / [] -      [] Speed's []+ / [] -   [] Dexter's []+ / [] -    [] Sulcus

## 2019-09-18 ENCOUNTER — OFFICE VISIT (OUTPATIENT)
Dept: ORTHOPEDIC SURGERY | Age: 24
End: 2019-09-18
Payer: MEDICAID

## 2019-09-18 VITALS
HEIGHT: 69 IN | DIASTOLIC BLOOD PRESSURE: 71 MMHG | WEIGHT: 171 LBS | BODY MASS INDEX: 25.33 KG/M2 | HEART RATE: 63 BPM | SYSTOLIC BLOOD PRESSURE: 136 MMHG

## 2019-09-18 DIAGNOSIS — Z98.890 S/P ARTHROSCOPY OF RIGHT SHOULDER: Primary | ICD-10-CM

## 2019-09-18 PROCEDURE — 1036F TOBACCO NON-USER: CPT | Performed by: ORTHOPAEDIC SURGERY

## 2019-09-18 PROCEDURE — 99213 OFFICE O/P EST LOW 20 MIN: CPT | Performed by: ORTHOPAEDIC SURGERY

## 2019-09-18 PROCEDURE — G8427 DOCREV CUR MEDS BY ELIG CLIN: HCPCS | Performed by: ORTHOPAEDIC SURGERY

## 2019-09-18 PROCEDURE — G8419 CALC BMI OUT NRM PARAM NOF/U: HCPCS | Performed by: ORTHOPAEDIC SURGERY

## 2019-09-24 ENCOUNTER — TREATMENT (OUTPATIENT)
Dept: PHYSICAL THERAPY | Age: 24
End: 2019-09-24
Payer: MEDICAID

## 2019-09-24 DIAGNOSIS — M25.311 INSTABILITY OF RIGHT SHOULDER JOINT: Primary | ICD-10-CM

## 2019-09-24 PROCEDURE — 97110 THERAPEUTIC EXERCISES: CPT | Performed by: PHYSICAL THERAPIST

## 2019-09-24 NOTE — PROGRESS NOTES
RTB TE               A:  New POC was established today of 2x week for 6 weeks. He was educated on his need to be compliant c new POC. His surgeon was most concerned c motion and pt was tx today c AAROM activities to improve global ROM of the shoulder. He was displaying an incr in ROM by the end of today's session. Will continue to progress as tolerated.   P: Continue with rehab plan  Rodriguez Verduzco, PT DPT [id number]    Treatment Charges: Mins Units   Initial Evaluation     Re-Evaluation 5 0   Ther Exercise         TE 44 3   Manual Therapy     MT     Ther Activities        TA     Gait Training          GT     Neuro Re-education NR     Modalities     Non-Billable Service Time     GROUP     Total Time/Units 49 3

## 2019-09-24 NOTE — PROGRESS NOTES
Sign []+ / [] -   [] Apprehension []+ / [] -   [] Bicep Load II []+ / [] -   [] Elbow Valgus []+ / [] -     [] Elbow Varus []+ / [] -   [] Gemini's relocation []+ / [] -   [] Empty Can []+ / [] -  [] Drop arm []+ / [] -  [] ER lag []+ / [] -  [] Painful Arc []+ / [] -  [] Geoffery Loser []+ / [] -  [] Belly Press/ lift off[]+ / [] -  [] Other: []+ / [] -                  CURRENT STATUS:  Observations: normal orthopedic exam, well nourished male, normal affect     Inspection: normal orthopedic exam, anterior shifted shoulder c winging on R.  anterior rotation of humerus c decr muscle tone globally. Palpation: Tender to palpation in anterior shoulder, slight anterior placement of humeral head in glenoid     Joint/Motion:  Right Shoulder:  AROM: 130* flexion, 120 scaption, 40* ER, 40* IR in scap plane  PROM: 145° Forward elevation, 120 scpation 60° ER,  40° IR @ 90*     Left Shoulder:  AROM: 165° Forward elevation, 160* abduction,  90° ER,  IR to 70  PROM: 180° Forward elevation,  90° ER , 90° IR     Strength:  Right Shoulder: 4-/5 global R shoulder     Left Shoulder: Flexion 4/5,  Abduction 4/5, ER 4/5, IR 4/5         Special Tests/Functional Screens:  NT d/t post op       COMMENTS AND RECOMMENDATIONS:   Pt returned to physician last week and was cleared to continue c PT services. He was found to have no instability according to surgeon testing, but severely limited in globally ROM. He is resuming PT services under new POC of 2x a week for 6 additional weeks to improve global ROM, strength, and fxn mobility of the shoulder. He was educated on the need to comply c protocol and PT services and that if he is noncompliant or not showing for visits he will be d/c d/t extensive history of noncompliance s/p surgery. Thank you for the opportunity to work with your patient.      Christine Mcgarry, PT DPT [id number]    I CERTIFY THAT THE ABOVE REASSESSMENT AND PLAN OF CARE FOR PHYSICAL THERAPY SERVICES ARE APPROPRIATE AND MEDICALLY NECESSARY.     Duration: From 9/24/2019 thru 11/8/19    ________________________                _______________  Physician     Date

## 2019-09-26 ENCOUNTER — TREATMENT (OUTPATIENT)
Dept: PHYSICAL THERAPY | Age: 24
End: 2019-09-26
Payer: MEDICAID

## 2019-09-26 DIAGNOSIS — M25.311 INSTABILITY OF RIGHT SHOULDER JOINT: Primary | ICD-10-CM

## 2019-09-26 PROCEDURE — 97150 GROUP THERAPEUTIC PROCEDURES: CPT

## 2019-09-26 PROCEDURE — 97110 THERAPEUTIC EXERCISES: CPT

## 2019-09-26 NOTE — PROGRESS NOTES
[address] [facility]   Phone: [phone]   Fax: [phone]      Physical Therapy Daily Treatment Note    Date: 2019  Patient Name: Yoli Sharif  : 1995   MRN: [id number]  DOInjury: years  DOSx: 19   Referring Provider: Regino White MD  [address] [facility]     Medical Diagnosis:     Z98.773 (ICD-10-CM) - S/P arthroscopy of right shoulder   M25.311 (ICD-10-CM) - Shoulder instability, right     Right shoulder arthroscopy, capsulorrhaphy, subacromial bursectomy 19    Outcome Measure: QuickDASH     S: Patient reports he is feeling a little bit better this day. Patient reports he feels that his ROM is improving. O: Pt given written HEP  Time 220-315     Visit  Repeat outcome measure at mid point and end. Pain 3/10     ROM Right Shoulder:  AROM: 120* flexion, 115 scaption, 40* ER, 40* IR in scap plane  PROM: 130° Forward elevation, 120 scpation 40° ER,  40° IR @ 90*     Left Shoulder:  PNJK: 986° Forward elevation, 160* abduction,  90° ER,  IR to 70  PROM: 180° Forward elevation,  90° ER , 90° IR        Modalities      CP 10 min  none   Manual       manual         Stretch      Table slides 3x10 ea, flex/scaption HEP TE   Wall Flexion      Wall ER stretch      Towel IR stretch      IR reaching behind back      Exercise      AROM TE   UBE 10 min     Pulleys - flex 3x10 10s holds Flex/Scap TE   Supine wand chest press Add next     Supine wand flex 3x10 10s holds HEP  TE   Supine wand ER/IR 2x10 10s holds HEP TE   Supine flexion Add next      S-lying ABD      S-lying ER      Standing wand flex      Standing flexion      Standing ABD          Isometrics x10 5s holds, flex, abd, ,add HEP TE   ROWS: H Functional activities  To aid in ROM and strength needed for reaching , lifting ,pushing and pulling at home/work    ROWS: M 3x10 RTB TE   ROWS: L  \"    RTB TE   RTB TE               A:  Tolerated well.  Patient demonstrates improved motion this session. Performed therapy c multiple VCs given. Patient progressed c very light strengthening d/t improved AROM. Will continue to progress patient as tolerated.     P: Continue with rehab plan  Joycelyn Anthony, WAQAS  [id number]    Treatment Charges: Mins Units   Initial Evaluation     Re-Evaluation     Ther Exercise         TE 25 2   Manual Therapy     MT     Ther Activities        TA     Gait Training          GT     Neuro Re-education NR     Modalities     Non-Billable Service Time     GROUP 30 1   Total Time/Units 55 3

## 2019-10-08 ENCOUNTER — TREATMENT (OUTPATIENT)
Dept: PHYSICAL THERAPY | Age: 24
End: 2019-10-08
Payer: MEDICAID

## 2019-10-08 DIAGNOSIS — M25.311 INSTABILITY OF RIGHT SHOULDER JOINT: Primary | ICD-10-CM

## 2019-10-08 PROCEDURE — 97110 THERAPEUTIC EXERCISES: CPT | Performed by: PHYSICAL THERAPIST

## 2019-10-08 PROCEDURE — 97150 GROUP THERAPEUTIC PROCEDURES: CPT | Performed by: PHYSICAL THERAPIST

## 2019-10-10 ENCOUNTER — TREATMENT (OUTPATIENT)
Dept: PHYSICAL THERAPY | Age: 24
End: 2019-10-10

## 2019-10-10 DIAGNOSIS — M25.311 INSTABILITY OF RIGHT SHOULDER JOINT: Primary | ICD-10-CM

## 2019-10-17 ENCOUNTER — TREATMENT (OUTPATIENT)
Dept: PHYSICAL THERAPY | Age: 24
End: 2019-10-17
Payer: MEDICAID

## 2019-10-17 DIAGNOSIS — M25.311 INSTABILITY OF RIGHT SHOULDER JOINT: Primary | ICD-10-CM

## 2019-10-17 PROCEDURE — 97530 THERAPEUTIC ACTIVITIES: CPT | Performed by: PHYSICAL THERAPIST

## 2019-10-17 PROCEDURE — 97110 THERAPEUTIC EXERCISES: CPT | Performed by: PHYSICAL THERAPIST

## 2019-10-24 ENCOUNTER — TREATMENT (OUTPATIENT)
Dept: PHYSICAL THERAPY | Age: 24
End: 2019-10-24
Payer: MEDICAID

## 2019-10-24 DIAGNOSIS — M25.311 INSTABILITY OF RIGHT SHOULDER JOINT: Primary | ICD-10-CM

## 2019-10-24 PROCEDURE — 97530 THERAPEUTIC ACTIVITIES: CPT | Performed by: PHYSICAL THERAPIST

## 2019-10-24 PROCEDURE — 97150 GROUP THERAPEUTIC PROCEDURES: CPT | Performed by: PHYSICAL THERAPIST

## 2019-10-24 PROCEDURE — 97110 THERAPEUTIC EXERCISES: CPT | Performed by: PHYSICAL THERAPIST

## 2019-10-29 ENCOUNTER — TREATMENT (OUTPATIENT)
Dept: PHYSICAL THERAPY | Age: 24
End: 2019-10-29

## 2019-10-29 DIAGNOSIS — M25.311 INSTABILITY OF RIGHT SHOULDER JOINT: Primary | ICD-10-CM

## 2019-10-31 ENCOUNTER — TREATMENT (OUTPATIENT)
Dept: PHYSICAL THERAPY | Age: 24
End: 2019-10-31
Payer: MEDICAID

## 2019-10-31 DIAGNOSIS — M25.311 INSTABILITY OF RIGHT SHOULDER JOINT: Primary | ICD-10-CM

## 2019-10-31 PROCEDURE — 97530 THERAPEUTIC ACTIVITIES: CPT

## 2019-10-31 PROCEDURE — 97110 THERAPEUTIC EXERCISES: CPT

## 2019-10-31 PROCEDURE — 97150 GROUP THERAPEUTIC PROCEDURES: CPT

## 2019-11-05 ENCOUNTER — TREATMENT (OUTPATIENT)
Dept: PHYSICAL THERAPY | Age: 24
End: 2019-11-05
Payer: MEDICAID

## 2019-11-05 DIAGNOSIS — M25.311 INSTABILITY OF RIGHT SHOULDER JOINT: Primary | ICD-10-CM

## 2019-11-05 PROCEDURE — 97150 GROUP THERAPEUTIC PROCEDURES: CPT | Performed by: PHYSICAL THERAPIST

## 2019-11-05 PROCEDURE — 97110 THERAPEUTIC EXERCISES: CPT | Performed by: PHYSICAL THERAPIST

## 2019-11-05 PROCEDURE — 97530 THERAPEUTIC ACTIVITIES: CPT | Performed by: PHYSICAL THERAPIST

## 2019-11-06 ENCOUNTER — HOSPITAL ENCOUNTER (EMERGENCY)
Age: 24
Discharge: HOME | End: 2019-11-06
Attending: EMERGENCY MEDICINE
Payer: MEDICAID

## 2019-11-06 VITALS
TEMPERATURE: 98.2 F | DIASTOLIC BLOOD PRESSURE: 75 MMHG | SYSTOLIC BLOOD PRESSURE: 115 MMHG | RESPIRATION RATE: 16 BRPM | OXYGEN SATURATION: 99 % | HEART RATE: 68 BPM

## 2019-11-06 DIAGNOSIS — R45.851 DEPRESSION WITH SUICIDAL IDEATION: ICD-10-CM

## 2019-11-06 DIAGNOSIS — F32.A DEPRESSION WITH SUICIDAL IDEATION: ICD-10-CM

## 2019-11-06 DIAGNOSIS — R45.851 SUICIDAL IDEATION: Primary | ICD-10-CM

## 2019-11-06 DIAGNOSIS — M25.511 CHRONIC PAIN IN RIGHT SHOULDER: ICD-10-CM

## 2019-11-06 DIAGNOSIS — G89.29 CHRONIC PAIN IN RIGHT SHOULDER: ICD-10-CM

## 2019-11-06 DIAGNOSIS — F41.1 ANXIETY STATE: ICD-10-CM

## 2019-11-06 LAB
ACETAMINOPHEN LEVEL: <5 MCG/ML (ref 10–30)
ALBUMIN SERPL-MCNC: 4.9 G/DL (ref 3.5–5.2)
ALP BLD-CCNC: 83 U/L (ref 40–129)
ALT SERPL-CCNC: 21 U/L (ref 0–40)
AMPHETAMINE SCREEN, URINE: NOT DETECTED
ANION GAP SERPL CALCULATED.3IONS-SCNC: 7 MMOL/L (ref 7–16)
AST SERPL-CCNC: 23 U/L (ref 0–39)
BARBITURATE SCREEN URINE: NOT DETECTED
BASOPHILS ABSOLUTE: 0.03 E9/L (ref 0–0.2)
BASOPHILS RELATIVE PERCENT: 0.8 % (ref 0–2)
BENZODIAZEPINE SCREEN, URINE: NOT DETECTED
BILIRUB SERPL-MCNC: 0.3 MG/DL (ref 0–1.2)
BILIRUBIN URINE: NEGATIVE
BLOOD, URINE: NEGATIVE
BUN BLDV-MCNC: 9 MG/DL (ref 6–20)
CALCIUM SERPL-MCNC: 10.1 MG/DL (ref 8.6–10.2)
CANNABINOID SCREEN URINE: POSITIVE
CHLORIDE BLD-SCNC: 107 MMOL/L (ref 98–107)
CLARITY: CLEAR
CO2: 29 MMOL/L (ref 22–29)
COCAINE METABOLITE SCREEN URINE: NOT DETECTED
COLOR: YELLOW
CREAT SERPL-MCNC: 1.3 MG/DL (ref 0.7–1.2)
EOSINOPHILS ABSOLUTE: 0.03 E9/L (ref 0.05–0.5)
EOSINOPHILS RELATIVE PERCENT: 0.8 % (ref 0–6)
ETHANOL: <10 MG/DL (ref 0–0.08)
FENTANYL SCREEN, URINE: NOT DETECTED
GFR AFRICAN AMERICAN: >60
GFR NON-AFRICAN AMERICAN: >60 ML/MIN/1.73
GLUCOSE BLD-MCNC: 95 MG/DL (ref 74–99)
GLUCOSE URINE: NEGATIVE MG/DL
HCT VFR BLD CALC: 46.3 % (ref 37–54)
HEMOGLOBIN: 14.4 G/DL (ref 12.5–16.5)
IMMATURE GRANULOCYTES #: 0 E9/L
IMMATURE GRANULOCYTES %: 0 % (ref 0–5)
KETONES, URINE: NEGATIVE MG/DL
LEUKOCYTE ESTERASE, URINE: NEGATIVE
LYMPHOCYTES ABSOLUTE: 1.28 E9/L (ref 1.5–4)
LYMPHOCYTES RELATIVE PERCENT: 35.7 % (ref 20–42)
Lab: ABNORMAL
MCH RBC QN AUTO: 25.6 PG (ref 26–35)
MCHC RBC AUTO-ENTMCNC: 31.1 % (ref 32–34.5)
MCV RBC AUTO: 82.2 FL (ref 80–99.9)
METHADONE SCREEN, URINE: NOT DETECTED
MONOCYTES ABSOLUTE: 0.26 E9/L (ref 0.1–0.95)
MONOCYTES RELATIVE PERCENT: 7.2 % (ref 2–12)
NEUTROPHILS ABSOLUTE: 1.99 E9/L (ref 1.8–7.3)
NEUTROPHILS RELATIVE PERCENT: 55.5 % (ref 43–80)
NITRITE, URINE: NEGATIVE
OPIATE SCREEN URINE: NOT DETECTED
OXYCODONE URINE: NOT DETECTED
PDW BLD-RTO: 14.2 FL (ref 11.5–15)
PH UA: 6 (ref 5–9)
PHENCYCLIDINE SCREEN URINE: NOT DETECTED
PLATELET # BLD: 244 E9/L (ref 130–450)
PMV BLD AUTO: 10.8 FL (ref 7–12)
POTASSIUM SERPL-SCNC: 4.1 MMOL/L (ref 3.5–5)
PROTEIN UA: NEGATIVE MG/DL
RBC # BLD: 5.63 E12/L (ref 3.8–5.8)
SALICYLATE, SERUM: <0.3 MG/DL (ref 0–30)
SODIUM BLD-SCNC: 143 MMOL/L (ref 132–146)
SPECIFIC GRAVITY UA: 1.01 (ref 1–1.03)
TOTAL PROTEIN: 8.5 G/DL (ref 6.4–8.3)
TRICYCLIC ANTIDEPRESSANTS SCREEN SERUM: NEGATIVE NG/ML
UROBILINOGEN, URINE: 0.2 E.U./DL
WBC # BLD: 3.6 E9/L (ref 4.5–11.5)

## 2019-11-06 PROCEDURE — 81003 URINALYSIS AUTO W/O SCOPE: CPT

## 2019-11-06 PROCEDURE — 80053 COMPREHEN METABOLIC PANEL: CPT

## 2019-11-06 PROCEDURE — 36415 COLL VENOUS BLD VENIPUNCTURE: CPT

## 2019-11-06 PROCEDURE — 6370000000 HC RX 637 (ALT 250 FOR IP): Performed by: EMERGENCY MEDICINE

## 2019-11-06 PROCEDURE — G0480 DRUG TEST DEF 1-7 CLASSES: HCPCS

## 2019-11-06 PROCEDURE — 80307 DRUG TEST PRSMV CHEM ANLYZR: CPT

## 2019-11-06 PROCEDURE — 99285 EMERGENCY DEPT VISIT HI MDM: CPT

## 2019-11-06 PROCEDURE — 93005 ELECTROCARDIOGRAM TRACING: CPT | Performed by: EMERGENCY MEDICINE

## 2019-11-06 PROCEDURE — 85025 COMPLETE CBC W/AUTO DIFF WBC: CPT

## 2019-11-06 RX ORDER — HYDROXYZINE PAMOATE 25 MG/1
25 CAPSULE ORAL 3 TIMES DAILY PRN
Qty: 30 CAPSULE | Refills: 0 | Status: SHIPPED | OUTPATIENT
Start: 2019-11-06 | End: 2019-11-16

## 2019-11-06 RX ORDER — IBUPROFEN 800 MG/1
800 TABLET ORAL ONCE
Status: COMPLETED | OUTPATIENT
Start: 2019-11-06 | End: 2019-11-06

## 2019-11-06 RX ORDER — IBUPROFEN 800 MG/1
800 TABLET ORAL EVERY 8 HOURS PRN
Qty: 21 TABLET | Refills: 0 | Status: SHIPPED | OUTPATIENT
Start: 2019-11-06 | End: 2020-02-12

## 2019-11-06 RX ORDER — HYDROXYZINE PAMOATE 25 MG/1
25 CAPSULE ORAL ONCE
Status: COMPLETED | OUTPATIENT
Start: 2019-11-06 | End: 2019-11-06

## 2019-11-06 RX ADMIN — IBUPROFEN 800 MG: 800 TABLET, FILM COATED ORAL at 13:08

## 2019-11-06 RX ADMIN — HYDROXYZINE PAMOATE 25 MG: 25 CAPSULE ORAL at 13:07

## 2019-11-06 ASSESSMENT — PAIN SCALES - GENERAL: PAINLEVEL_OUTOF10: 5

## 2019-11-07 ENCOUNTER — TREATMENT (OUTPATIENT)
Dept: PHYSICAL THERAPY | Age: 24
End: 2019-11-07
Payer: MEDICAID

## 2019-11-07 DIAGNOSIS — M25.311 INSTABILITY OF RIGHT SHOULDER JOINT: Primary | ICD-10-CM

## 2019-11-07 LAB
EKG ATRIAL RATE: 60 BPM
EKG P AXIS: 74 DEGREES
EKG P-R INTERVAL: 138 MS
EKG Q-T INTERVAL: 398 MS
EKG QRS DURATION: 102 MS
EKG QTC CALCULATION (BAZETT): 398 MS
EKG R AXIS: 73 DEGREES
EKG T AXIS: 69 DEGREES
EKG VENTRICULAR RATE: 60 BPM

## 2019-11-07 PROCEDURE — 97150 GROUP THERAPEUTIC PROCEDURES: CPT | Performed by: PHYSICAL THERAPIST

## 2019-11-07 PROCEDURE — 93010 ELECTROCARDIOGRAM REPORT: CPT | Performed by: INTERNAL MEDICINE

## 2019-11-07 PROCEDURE — 97530 THERAPEUTIC ACTIVITIES: CPT | Performed by: PHYSICAL THERAPIST

## 2019-11-07 PROCEDURE — 97110 THERAPEUTIC EXERCISES: CPT | Performed by: PHYSICAL THERAPIST

## 2019-11-11 ENCOUNTER — TREATMENT (OUTPATIENT)
Dept: PHYSICAL THERAPY | Age: 24
End: 2019-11-11
Payer: MEDICAID

## 2019-11-11 ENCOUNTER — OFFICE VISIT (OUTPATIENT)
Dept: ORTHOPEDIC SURGERY | Age: 24
End: 2019-11-11
Payer: MEDICAID

## 2019-11-11 VITALS — BODY MASS INDEX: 25.25 KG/M2 | HEIGHT: 69 IN

## 2019-11-11 DIAGNOSIS — M25.311 INSTABILITY OF RIGHT SHOULDER JOINT: Primary | ICD-10-CM

## 2019-11-11 DIAGNOSIS — Z98.890 S/P ARTHROSCOPY OF RIGHT SHOULDER: Primary | ICD-10-CM

## 2019-11-11 PROCEDURE — 1036F TOBACCO NON-USER: CPT | Performed by: ORTHOPAEDIC SURGERY

## 2019-11-11 PROCEDURE — G8484 FLU IMMUNIZE NO ADMIN: HCPCS | Performed by: ORTHOPAEDIC SURGERY

## 2019-11-11 PROCEDURE — G8427 DOCREV CUR MEDS BY ELIG CLIN: HCPCS | Performed by: ORTHOPAEDIC SURGERY

## 2019-11-11 PROCEDURE — G8419 CALC BMI OUT NRM PARAM NOF/U: HCPCS | Performed by: ORTHOPAEDIC SURGERY

## 2019-11-11 PROCEDURE — 97530 THERAPEUTIC ACTIVITIES: CPT | Performed by: PHYSICAL THERAPIST

## 2019-11-11 PROCEDURE — 99213 OFFICE O/P EST LOW 20 MIN: CPT | Performed by: ORTHOPAEDIC SURGERY

## 2019-11-11 PROCEDURE — 97110 THERAPEUTIC EXERCISES: CPT | Performed by: PHYSICAL THERAPIST

## 2019-11-14 DIAGNOSIS — Z98.890 S/P ARTHROSCOPY OF RIGHT SHOULDER: Primary | ICD-10-CM

## 2019-11-14 RX ORDER — IBUPROFEN 800 MG/1
800 TABLET ORAL EVERY 6 HOURS PRN
Qty: 40 TABLET | Refills: 2 | Status: SHIPPED | OUTPATIENT
Start: 2019-11-14

## 2019-11-15 ENCOUNTER — TREATMENT (OUTPATIENT)
Dept: PHYSICAL THERAPY | Age: 24
End: 2019-11-15
Payer: MEDICAID

## 2019-11-15 DIAGNOSIS — M25.311 INSTABILITY OF RIGHT SHOULDER JOINT: Primary | ICD-10-CM

## 2019-11-15 PROCEDURE — 97530 THERAPEUTIC ACTIVITIES: CPT | Performed by: PHYSICAL THERAPIST

## 2019-11-15 PROCEDURE — 97110 THERAPEUTIC EXERCISES: CPT | Performed by: PHYSICAL THERAPIST

## 2019-11-25 ENCOUNTER — TREATMENT (OUTPATIENT)
Dept: PHYSICAL THERAPY | Age: 24
End: 2019-11-25
Payer: MEDICAID

## 2019-11-25 DIAGNOSIS — M25.311 INSTABILITY OF RIGHT SHOULDER JOINT: Primary | ICD-10-CM

## 2019-11-25 PROCEDURE — 97530 THERAPEUTIC ACTIVITIES: CPT | Performed by: PHYSICAL THERAPIST

## 2019-12-02 ENCOUNTER — TREATMENT (OUTPATIENT)
Dept: PHYSICAL THERAPY | Age: 24
End: 2019-12-02

## 2020-01-07 NOTE — DISCHARGE SUMMARY
[address] [facility]   Phone: [phone]   Fax: [phone]    Date: 2020  Patient Name: Paula Sotelo  : 1995              MRN: [id number]  DOInjury: years  DOSx: 19   Referring Provider: Shahbaz Cuadra MD  [address] [facility]                                  Medical Diagnosis:      A93.755 (ICD-10-CM) - S/P arthroscopy of right shoulder   M25.311 (ICD-10-CM) - Shoulder instability, right      Right shoulder arthroscopy, capsulorrhaphy, subacromial bursectomy 19      REASON FOR DISCHARGE: Pt had made considerable progress c PT services, but began not showing to appointments. He was called s return phone calls to schedule additional sessions. He is being d/c today d/t lapse of care. He was cleared to return to work s restriction before he began not attending follow up appointments. He was doing well c ADL fxn, but hoped to return to boxing, of which he was not objectively ready for. He will be d/c at this time. RECOMMENDATIONS: call c questions or if additional sessions are warranted to complete return to sport training. Thank you for the opportunity to work with your patient. If you have questions or comments, please contact me at numbers listed above.       Winston Quinn, PT PT , DPT PT [id number]

## 2020-01-21 ENCOUNTER — HOSPITAL ENCOUNTER (EMERGENCY)
Age: 25
Discharge: HOME | End: 2020-01-21
Payer: MEDICAID

## 2020-01-21 ENCOUNTER — APPOINTMENT (OUTPATIENT)
Dept: GENERAL RADIOLOGY | Age: 25
End: 2020-01-21
Payer: MEDICAID

## 2020-01-21 VITALS
HEART RATE: 60 BPM | HEIGHT: 69 IN | OXYGEN SATURATION: 100 % | RESPIRATION RATE: 16 BRPM | TEMPERATURE: 97.1 F | WEIGHT: 171 LBS | BODY MASS INDEX: 25.33 KG/M2 | SYSTOLIC BLOOD PRESSURE: 115 MMHG | DIASTOLIC BLOOD PRESSURE: 72 MMHG

## 2020-01-21 PROCEDURE — 99283 EMERGENCY DEPT VISIT LOW MDM: CPT

## 2020-01-21 PROCEDURE — 73030 X-RAY EXAM OF SHOULDER: CPT

## 2020-01-21 RX ORDER — IBUPROFEN 800 MG/1
800 TABLET ORAL ONCE
Status: DISCONTINUED | OUTPATIENT
Start: 2020-01-21 | End: 2020-01-21 | Stop reason: HOSPADM

## 2020-01-21 RX ORDER — NAPROXEN 500 MG/1
500 TABLET ORAL 2 TIMES DAILY
Qty: 60 TABLET | Refills: 0 | Status: ON HOLD | OUTPATIENT
Start: 2020-01-21 | End: 2020-03-17 | Stop reason: HOSPADM

## 2020-01-21 ASSESSMENT — PAIN DESCRIPTION - LOCATION: LOCATION: SHOULDER

## 2020-01-21 NOTE — ED PROVIDER NOTES
[facility]     Department of Emergency Medicine   ED  Provider Note  Admit Date/RoomTime: 1/21/2020  4:24 PM  ED Room: [address] [place] [address]  Chief Complaint:   Shoulder Pain (right shoulder \"pressure,\" recently had surgery, has follow up appt with ortho next week but just wanted to get shoulder checked out to make sure nothing has changed )    History of Present Illness   Source of history provided by:  patient. History/Exam Limitations: none. Fabian Lynch is a 21 y.o. old male who has a past medical history of:   Past Medical History:   Diagnosis Date    Anxiety attack     Dislocation of shoulder     right    Schizophrenia ([facility] [address]    presents to the emergency department by private vehicle, for Right shoulder pain which occured 1 week(s) prior to arrival. Cause of complaint: repetitive injury while at home. There has been a history of previous shoulder injury where patient had right shoulder surgery done by Dr. Keiko Medellin as well as rehab about 2 years ago. Patient states his shoulder has felt like \"pressure in the joint he wanted to ensure nothing had changed. Patient does have an appointment next week. Ayde Ochoa He is right handed. The patients tetanus status is up to date. Since onset the symptoms have been mild in degree. His pain is aggraveated by movement, use and palpation and relieved by nothing. ROS    Pertinent positives and negatives are stated within HPI, all other systems reviewed and are negative. Past Surgical History:  has a past surgical history that includes Dental surgery and Shoulder arthroscopy (Right, 6/13/2019). Social History:  reports that he has never smoked. He has never used smokeless tobacco. He reports that he does not drink alcohol or use drugs. Family History: family history is not on file.    Allergies: Depakote [divalproex sodium]    Physical Exam           ED Triage Vitals   BP Temp Temp src Pulse Resp SpO2 Height Weight   01/21/20 1635 01/21/20 1635 -- 01/21/20 1623 01/21/20 1635 01/21/20 1623 01/21/20 1635 01/21/20 1635   115/72 97.1 °F (36.2 °C)  60 16 100 % 5' 9\" (1.753 m) 171 lb (77.6 kg)      Oxygen Saturation Interpretation: Normal    Constitutional:  Alert, development consistent with age. Neck:  Normal ROM. Supple. Non-tender. Shoulder:  Right acromioclavicular joint. Tenderness: mild              Swelling: None. Deformity: no.              ROM: full range with pain. Skin:  no erythema, rash or wounds noted. No evidence of a joint effusion or septic joint. Patient still has full flexion extension and can abduct and adduct and supinate and pronate without issue. Patient is neurovascular and sensory intact. Neurovascular: Motor deficit: none. Sensory deficit: none. Pulse deficit: none. Capillary refill: normal.    Elbow:              Tenderness:  none. Swelling: None. Deformity: no.              ROM: full range of motion. Skin:  no erythema, rash or wounds noted. Lymphatics: No lymphangitis or edema noted  Neurological:  Oriented. Motor functions intact. Lab / Imaging Results   (All laboratory and radiology results have been personally reviewed by myself)  Labs:  No results found for this visit on 01/21/20. Imaging: All Radiology results interpreted by Radiologist unless otherwise noted. XR SHOULDER RIGHT (MIN 2 VIEWS)   Final Result   No significant abnormal findings. ED Course / Medical Decision Making     Medications   ibuprofen (ADVIL;MOTRIN) tablet 800 mg (has no administration in time range)          Consult(s):   None    Procedure(s):   none    MDM:   Patient is a 25-year-old male who presented to the emergency department with chronic right shoulder pain that is increased in his AC joint. Films were obtained based on low  suspicion for bony injury as per history/physical findings .   No acute fracture or dislocation was noted. Patient was given Motrin. Patient was explained since he has had shoulder surgery on this before and has an appointment with Dr. Linda Bonilla that he should keep that appointment for further evaluation. Patient was explained it may just be due to inflammation and previous surgery in the area. He was told to apply ice 20 minutes on 20 minutes off multiple times a day and to continue to use the shoulder and do stretches which were printed for him. He was told he can alternate Tylenol and ibuprofen for his discomfort with food. Patient was advised of the worsening signs symptoms. No evidence of a septic joint at this time. Patient's vitals are stable and he is ready for discharge. Plan is subsequently for symptom control, limited use and  immobilization with appropriate outpatient follow-up. Patient was explicitly instructed on specific signs and symptoms on which to return to the emergency room for. Patient was instructed to return to the ER for any new or worsening symptoms. Additional discharge instructions were given verbally. All questions were answered. Patient is comfortable and agreeable with discharge plan. Patient in no acute distress and non-toxic in appearance. Counseling: The emergency provider has spoken with the patient and discussed todays results, in addition to providing specific details for the plan of care and counseling regarding the diagnosis and prognosis. Questions are answered at this time and they are agreeable with the plan. Assessment      1. Chronic right shoulder pain      Plan   Discharge to home  Patient condition is stable    New Medications     New Prescriptions    NAPROXEN (NAPROSYN) 500 MG TABLET    Take 1 tablet by mouth 2 times daily Please alternate this with Tylenol and take it with food     Electronically signed by Aysha Bradley PA-C   DD: 1/21/20  **This report was transcribed using voice recognition software.  Every effort was made to ensure accuracy; however, inadvertent computerized transcription errors may be present.   END OF ED PROVIDER NOTE           Claire Pham PA-C  01/21/20 717 Sheela Dey PA-C  01/21/20 2550

## 2020-02-12 ENCOUNTER — OFFICE VISIT (OUTPATIENT)
Dept: ORTHOPEDIC SURGERY | Age: 25
End: 2020-02-12
Payer: MEDICAID

## 2020-02-12 VITALS
SYSTOLIC BLOOD PRESSURE: 136 MMHG | DIASTOLIC BLOOD PRESSURE: 76 MMHG | WEIGHT: 170 LBS | HEART RATE: 59 BPM | HEIGHT: 69 IN | BODY MASS INDEX: 25.18 KG/M2

## 2020-02-12 PROCEDURE — G8484 FLU IMMUNIZE NO ADMIN: HCPCS | Performed by: ORTHOPAEDIC SURGERY

## 2020-02-12 PROCEDURE — 1036F TOBACCO NON-USER: CPT | Performed by: ORTHOPAEDIC SURGERY

## 2020-02-12 PROCEDURE — 99213 OFFICE O/P EST LOW 20 MIN: CPT | Performed by: ORTHOPAEDIC SURGERY

## 2020-02-12 PROCEDURE — G8427 DOCREV CUR MEDS BY ELIG CLIN: HCPCS | Performed by: ORTHOPAEDIC SURGERY

## 2020-02-12 PROCEDURE — G8419 CALC BMI OUT NRM PARAM NOF/U: HCPCS | Performed by: ORTHOPAEDIC SURGERY

## 2020-03-04 ENCOUNTER — HOSPITAL ENCOUNTER (EMERGENCY)
Age: 25
Discharge: HOME | End: 2020-03-04
Payer: MEDICAID

## 2020-03-04 VITALS
BODY MASS INDEX: 26.07 KG/M2 | HEIGHT: 69 IN | HEART RATE: 81 BPM | RESPIRATION RATE: 18 BRPM | DIASTOLIC BLOOD PRESSURE: 78 MMHG | SYSTOLIC BLOOD PRESSURE: 130 MMHG | OXYGEN SATURATION: 97 % | WEIGHT: 176 LBS | TEMPERATURE: 98 F

## 2020-03-04 LAB
ALBUMIN SERPL-MCNC: 4.6 G/DL (ref 3.5–5.2)
ALP BLD-CCNC: 73 U/L (ref 40–129)
ALT SERPL-CCNC: 37 U/L (ref 0–40)
AMPHETAMINE SCREEN, URINE: NOT DETECTED
ANION GAP SERPL CALCULATED.3IONS-SCNC: 11 MMOL/L (ref 7–16)
AST SERPL-CCNC: 39 U/L (ref 0–39)
BACTERIA: ABNORMAL /HPF
BARBITURATE SCREEN URINE: NOT DETECTED
BASOPHILS ABSOLUTE: 0.02 E9/L (ref 0–0.2)
BASOPHILS RELATIVE PERCENT: 0.6 % (ref 0–2)
BENZODIAZEPINE SCREEN, URINE: NOT DETECTED
BILIRUB SERPL-MCNC: 0.4 MG/DL (ref 0–1.2)
BILIRUBIN URINE: NEGATIVE
BLOOD, URINE: NEGATIVE
BUN BLDV-MCNC: 9 MG/DL (ref 6–20)
CALCIUM SERPL-MCNC: 9.1 MG/DL (ref 8.6–10.2)
CANNABINOID SCREEN URINE: POSITIVE
CHLORIDE BLD-SCNC: 106 MMOL/L (ref 98–107)
CLARITY: CLEAR
CO2: 23 MMOL/L (ref 22–29)
COCAINE METABOLITE SCREEN URINE: NOT DETECTED
COLOR: YELLOW
CREAT SERPL-MCNC: 1.3 MG/DL (ref 0.7–1.2)
EOSINOPHILS ABSOLUTE: 0.05 E9/L (ref 0.05–0.5)
EOSINOPHILS RELATIVE PERCENT: 1.4 % (ref 0–6)
FENTANYL SCREEN, URINE: NOT DETECTED
GFR AFRICAN AMERICAN: >60
GFR NON-AFRICAN AMERICAN: >60 ML/MIN/1.73
GLUCOSE BLD-MCNC: 111 MG/DL (ref 74–99)
GLUCOSE URINE: NEGATIVE MG/DL
HCT VFR BLD CALC: 40.1 % (ref 37–54)
HEMOGLOBIN: 12.7 G/DL (ref 12.5–16.5)
IMMATURE GRANULOCYTES #: 0.01 E9/L
IMMATURE GRANULOCYTES %: 0.3 % (ref 0–5)
KETONES, URINE: NEGATIVE MG/DL
LEUKOCYTE ESTERASE, URINE: ABNORMAL
LYMPHOCYTES ABSOLUTE: 1.25 E9/L (ref 1.5–4)
LYMPHOCYTES RELATIVE PERCENT: 34.7 % (ref 20–42)
Lab: ABNORMAL
MAGNESIUM: 2.1 MG/DL (ref 1.6–2.6)
MCH RBC QN AUTO: 25.9 PG (ref 26–35)
MCHC RBC AUTO-ENTMCNC: 31.7 % (ref 32–34.5)
MCV RBC AUTO: 81.7 FL (ref 80–99.9)
METHADONE SCREEN, URINE: NOT DETECTED
MONOCYTES ABSOLUTE: 0.34 E9/L (ref 0.1–0.95)
MONOCYTES RELATIVE PERCENT: 9.4 % (ref 2–12)
NEUTROPHILS ABSOLUTE: 1.93 E9/L (ref 1.8–7.3)
NEUTROPHILS RELATIVE PERCENT: 53.6 % (ref 43–80)
NITRITE, URINE: NEGATIVE
OPIATE SCREEN URINE: NOT DETECTED
OXYCODONE URINE: NOT DETECTED
PDW BLD-RTO: 13.8 FL (ref 11.5–15)
PH UA: 7.5 (ref 5–9)
PHENCYCLIDINE SCREEN URINE: NOT DETECTED
PLATELET # BLD: 228 E9/L (ref 130–450)
PMV BLD AUTO: 11 FL (ref 7–12)
POTASSIUM SERPL-SCNC: 3.8 MMOL/L (ref 3.5–5)
PROTEIN UA: NEGATIVE MG/DL
RBC # BLD: 4.91 E12/L (ref 3.8–5.8)
RBC UA: ABNORMAL /HPF (ref 0–2)
SODIUM BLD-SCNC: 140 MMOL/L (ref 132–146)
SPECIFIC GRAVITY UA: 1.02 (ref 1–1.03)
TOTAL CK: 923 U/L (ref 20–200)
TOTAL CK: 991 U/L (ref 20–200)
TOTAL PROTEIN: 7.3 G/DL (ref 6.4–8.3)
TROPONIN: <0.01 NG/ML (ref 0–0.03)
UROBILINOGEN, URINE: 1 E.U./DL
WBC # BLD: 3.6 E9/L (ref 4.5–11.5)
WBC UA: ABNORMAL /HPF (ref 0–5)

## 2020-03-04 PROCEDURE — 2580000003 HC RX 258: Performed by: NURSE PRACTITIONER

## 2020-03-04 PROCEDURE — 80307 DRUG TEST PRSMV CHEM ANLYZR: CPT

## 2020-03-04 PROCEDURE — 83735 ASSAY OF MAGNESIUM: CPT

## 2020-03-04 PROCEDURE — 99283 EMERGENCY DEPT VISIT LOW MDM: CPT

## 2020-03-04 PROCEDURE — 80053 COMPREHEN METABOLIC PANEL: CPT

## 2020-03-04 PROCEDURE — 81001 URINALYSIS AUTO W/SCOPE: CPT

## 2020-03-04 PROCEDURE — 84484 ASSAY OF TROPONIN QUANT: CPT

## 2020-03-04 PROCEDURE — 85025 COMPLETE CBC W/AUTO DIFF WBC: CPT

## 2020-03-04 PROCEDURE — 96361 HYDRATE IV INFUSION ADD-ON: CPT

## 2020-03-04 PROCEDURE — 82550 ASSAY OF CK (CPK): CPT

## 2020-03-04 PROCEDURE — 96360 HYDRATION IV INFUSION INIT: CPT

## 2020-03-04 PROCEDURE — 93005 ELECTROCARDIOGRAM TRACING: CPT | Performed by: NURSE PRACTITIONER

## 2020-03-04 RX ORDER — 0.9 % SODIUM CHLORIDE 0.9 %
1000 INTRAVENOUS SOLUTION INTRAVENOUS ONCE
Status: COMPLETED | OUTPATIENT
Start: 2020-03-04 | End: 2020-03-04

## 2020-03-04 RX ADMIN — SODIUM CHLORIDE 1000 ML: 9 INJECTION, SOLUTION INTRAVENOUS at 22:17

## 2020-03-04 RX ADMIN — SODIUM CHLORIDE 1000 ML: 9 INJECTION, SOLUTION INTRAVENOUS at 20:26

## 2020-03-04 ASSESSMENT — PAIN SCALES - GENERAL: PAINLEVEL_OUTOF10: 8

## 2020-03-05 LAB
EKG ATRIAL RATE: 69 BPM
EKG P AXIS: 79 DEGREES
EKG P-R INTERVAL: 134 MS
EKG Q-T INTERVAL: 386 MS
EKG QRS DURATION: 88 MS
EKG QTC CALCULATION (BAZETT): 413 MS
EKG R AXIS: 94 DEGREES
EKG T AXIS: -3 DEGREES
EKG VENTRICULAR RATE: 69 BPM

## 2020-03-05 PROCEDURE — 93010 ELECTROCARDIOGRAM REPORT: CPT | Performed by: INTERNAL MEDICINE

## 2020-03-05 NOTE — ED PROVIDER NOTES
Independent   HPI:  3/4/20, Time: 8:13 PM         Vinicio Lpóez is a 31 y.o. male presenting to the ED for widespread body aches and pains. Patient reports that he has been having body aches and pains for 2 months now. He states that some days are better than others. Patient reports no fevers no cough no injury or trauma. 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 But he cannot recall anything specific. He denies working out he denies any workout supplements. Patient denies any chest pain, shortness of breath or abdominal pain he denies any recent illness as well as no noted nausea, vomiting or diarrhea. Review of Systems:   Pertinent positives and negatives are stated within HPI, all other systems reviewed and are negative.          --------------------------------------------- PAST HISTORY ---------------------------------------------  Past Medical History:  has a past medical history of Anxiety attack, Dislocation of shoulder, and Schizophrenia ([facility] [address] Past Surgical History:  has a past surgical history that includes Dental surgery and Shoulder arthroscopy (Right, 6/13/2019). Social History:  reports that he has never smoked. He has never used smokeless tobacco. He reports that he does not drink alcohol or use drugs. Family History: family history is not on file. The patients home medications have been reviewed.     Allergies: Depakote [divalproex sodium]    -------------------------------------------------- RESULTS -------------------------------------------------  All laboratory and radiology results have been personally reviewed by myself   LABS:  Results for orders placed or performed during the hospital encounter of 03/04/20   CK   Result Value Ref Range    Total  (H) 20 - 200 U/L   CBC Auto Differential   Result Value Ref Range    WBC 3.6 (L) 4.5 - 11.5 E9/L    RBC 4.91 3.80 - 5.80 E12/L    Hemoglobin 12.7 12.5 - 16.5 g/dL    Hematocrit 40.1 37.0 - 54.0 %    MCV 81.7 80.0 - 99.9 fL    MCH 25.9 (L) 26.0 - 35.0 pg    MCHC 31.7 (L) 32.0 - 34.5 %    RDW 13.8 11.5 - 15.0 fL    Platelets 504 651 - 667 E9/L    MPV 11.0 7.0 - 12.0 fL    Neutrophils % 53.6 43.0 - 80.0 %    Immature Granulocytes % 0.3 0.0 - 5.0 %    Lymphocytes % 34.7 20.0 - 42.0 %    Monocytes % 9.4 2.0 - 12.0 %    Eosinophils % 1.4 0.0 - 6.0 %    Basophils % 0.6 0.0 - 2.0 %    Neutrophils Absolute 1.93 1.80 - 7.30 E9/L    Immature Granulocytes # 0.01 E9/L    Lymphocytes Absolute 1.25 (L) 1.50 - 4.00 E9/L    Monocytes Absolute 0.34 0.10 - 0.95 E9/L    Eosinophils Absolute 0.05 0.05 - 0.50 E9/L    Basophils Absolute 0.02 0.00 - 0.20 E9/L   Comprehensive Metabolic Panel   Result Value Ref Range    Sodium 140 132 - 146 mmol/L    Potassium 3.8 3.5 - 5.0 mmol/L    Chloride 106 98 - 107 mmol/L    CO2 23 22 - 29 mmol/L    Anion Gap 11 7 - 16 mmol/L    Glucose 111 (H) 74 - 99 mg/dL    BUN 9 6 - 20 mg/dL    CREATININE 1.3 (H) 0.7 - 1.2 mg/dL    GFR Non-African American >60 >=60 mL/min/1.73    GFR African American >60     Calcium 9.1 8.6 - 10.2 mg/dL    Total Protein 7.3 6.4 - 8.3 g/dL    Alb 4.6 3.5 - 5.2 g/dL    Total Bilirubin 0.4 0.0 - 1.2 mg/dL    Alkaline Phosphatase 73 40 - 129 U/L    ALT 37 0 - 40 U/L    AST 39 0 - 39 U/L   Urinalysis   Result Value Ref Range    Color, UA Yellow Straw/Yellow    Clarity, UA Clear Clear    Glucose, Ur Negative Negative mg/dL    Bilirubin Urine Negative Negative    Ketones, Urine Negative Negative mg/dL    Specific Gravity, UA 1.020 1.005 - 1.030    Blood, Urine Negative Negative    pH, UA 7.5 5.0 - 9.0    Protein, UA Negative Negative mg/dL    Urobilinogen, Urine 1.0 <2.0 E.U./dL    Nitrite, Urine Negative Negative    Leukocyte Esterase, Urine TRACE (A) Negative   Microscopic Urinalysis   Result Value Ref Range    WBC, UA 0-1 0 - 5 /HPF    RBC, UA NONE 0 - 2 /HPF    Bacteria, UA RARE (A) None Seen /HPF   Urine Drug Screen   Result Value Ref Range    Amphetamine Screen, Urine NOT DETECTED Negative <1000 ng/mL    Barbiturate Screen, Ur NOT DETECTED Negative < 200 ng/mL    Benzodiazepine Screen, Urine NOT DETECTED Negative < 200 ng/mL    Cannabinoid Scrn, Ur POSITIVE (A) Negative < 50ng/mL    Cocaine Metabolite Screen, Urine NOT DETECTED Negative < 300 ng/mL    Opiate Scrn, Ur NOT DETECTED Negative < 300ng/mL    PCP Screen, Urine NOT DETECTED Negative < 25 ng/mL    Methadone Screen, Urine NOT DETECTED Negative <300 ng/mL    Oxycodone Urine NOT DETECTED Negative <100 ng/mL    FENTANYL SCREEN, URINE NOT DETECTED Negative <1 ng/mL    Drug Screen Comment: see below    Troponin   Result Value Ref Range    Troponin <0.01 0.00 - 0.03 ng/mL   Magnesium   Result Value Ref Range    Magnesium 2.1 1.6 - 2.6 mg/dL   CK   Result Value Ref Range    Total  (H) 20 - 200 U/L   EKG 12 Lead   Result Value Ref Range    Ventricular Rate 62 BPM    Atrial Rate 62 BPM    P-R Interval 132 ms    QRS Duration 90 ms    Q-T Interval 392 ms    QTc Calculation (Bazett) 397 ms    P Axis 92 degrees    R Axis 106 degrees    T Axis 8 degrees       RADIOLOGY:  Interpreted by Radiologist.  No orders to display       ------------------------- NURSING NOTES AND VITALS REVIEWED ---------------------------   The nursing notes within the ED encounter and vital signs as below have been reviewed. BP (!) 146/72   Pulse 78   Temp 98.3 °F (36.8 °C)   Resp 18   Ht 5' 9\" (1.753 m)   Wt 176 lb (79.8 kg)   SpO2 96%   BMI 25.99 kg/m²   Oxygen Saturation Interpretation: Normal      ---------------------------------------------------PHYSICAL EXAM--------------------------------------      Constitutional/General: Alert and oriented x3, well appearing, non toxic in NAD  Head: Normocephalic and atraumatic  Eyes: PERRL, EOMI  Mouth: Oropharynx clear, handling secretions, no trismus  Neck: Supple, full ROM,   Pulmonary: Lungs clear to auscultation bilaterally, no wheezes, rales, or rhonchi.  Not in respiratory distress  Cardiovascular:  Regular rate and rhythm, no murmurs, gallops, or rubs. 2+ distal pulses  Abdomen: Soft, non tender, non distended,   Extremities: Moves all extremities x 4. Warm and well perfused  Skin: warm and dry without rash  Neurologic: GCS 15,  Psych: Normal Affect      ------------------------------ ED COURSE/MEDICAL DECISION MAKING----------------------  Medications   0.9 % sodium chloride bolus (0 mLs Intravenous Stopped 3/4/20 2213)   0.9 % sodium chloride bolus (0 mLs Intravenous Stopped 3/4/20 5159)         ED COURSE:       Medical Decision Making: Plan will be for labs will medicate for symptom relief. Will rule out muscle injury versus myalgia. Labs resulted CK is elevated at 991, CBC essentially unremarkable, chemistry panel with elevated creatinine of 1.3 patient has been 1.3 in the past.  Urinalysis showing trace leukocytes but negative for any infectious component. Troponin is negative, magnesium level normal.  Twelve-lead EKG interpreted showing a heart rate of 69, normal sinus rhythm with sinus arrhythmia with rightward axis deviation. There are also some new T wave abnormality noted when compared to previous EKG from November 6, 2019 . Patient did receive total 2 L of normal saline, repeat CK after the first liter started to trend down it is 923. Patient himself has been able to ambulate in fact got up multiple times to ambulate to the bathroom he also was provided with a lunch box and as well as drinking water and pop. Patient does not have any unusual joint swelling or erythema. Patient is not on any statins he denies any injury or trauma. Patient was made aware of results he was educated on the importance of good follow-up care as well as the importance of good hydration as well as when to return back to the emergency department. patient expressed understanding and safely discharged home. Counseling:    The emergency provider has spoken with the patient and discussed todays results, in addition to

## 2020-03-12 ENCOUNTER — OFFICE VISIT (OUTPATIENT)
Dept: FAMILY MEDICINE CLINIC | Age: 25
End: 2020-03-12
Payer: MEDICAID

## 2020-03-12 VITALS
TEMPERATURE: 98.3 F | HEART RATE: 65 BPM | SYSTOLIC BLOOD PRESSURE: 128 MMHG | WEIGHT: 182 LBS | OXYGEN SATURATION: 98 % | HEIGHT: 68 IN | BODY MASS INDEX: 27.58 KG/M2 | DIASTOLIC BLOOD PRESSURE: 48 MMHG

## 2020-03-12 PROCEDURE — G8419 CALC BMI OUT NRM PARAM NOF/U: HCPCS | Performed by: FAMILY MEDICINE

## 2020-03-12 PROCEDURE — 1036F TOBACCO NON-USER: CPT | Performed by: FAMILY MEDICINE

## 2020-03-12 PROCEDURE — G8484 FLU IMMUNIZE NO ADMIN: HCPCS | Performed by: FAMILY MEDICINE

## 2020-03-12 PROCEDURE — 99213 OFFICE O/P EST LOW 20 MIN: CPT | Performed by: FAMILY MEDICINE

## 2020-03-12 PROCEDURE — 99213 OFFICE O/P EST LOW 20 MIN: CPT | Performed by: STUDENT IN AN ORGANIZED HEALTH CARE EDUCATION/TRAINING PROGRAM

## 2020-03-12 PROCEDURE — 99212 OFFICE O/P EST SF 10 MIN: CPT | Performed by: STUDENT IN AN ORGANIZED HEALTH CARE EDUCATION/TRAINING PROGRAM

## 2020-03-12 PROCEDURE — G8427 DOCREV CUR MEDS BY ELIG CLIN: HCPCS | Performed by: FAMILY MEDICINE

## 2020-03-12 ASSESSMENT — PATIENT HEALTH QUESTIONNAIRE - PHQ9
4. FEELING TIRED OR HAVING LITTLE ENERGY: 1
5. POOR APPETITE OR OVEREATING: 3
10. IF YOU CHECKED OFF ANY PROBLEMS, HOW DIFFICULT HAVE THESE PROBLEMS MADE IT FOR YOU TO DO YOUR WORK, TAKE CARE OF THINGS AT HOME, OR GET ALONG WITH OTHER PEOPLE: 2
9. THOUGHTS THAT YOU WOULD BE BETTER OFF DEAD, OR OF HURTING YOURSELF: 0
SUM OF ALL RESPONSES TO PHQ9 QUESTIONS 1 & 2: 4
3. TROUBLE FALLING OR STAYING ASLEEP: 2
7. TROUBLE CONCENTRATING ON THINGS, SUCH AS READING THE NEWSPAPER OR WATCHING TELEVISION: 1
1. LITTLE INTEREST OR PLEASURE IN DOING THINGS: 1
6. FEELING BAD ABOUT YOURSELF - OR THAT YOU ARE A FAILURE OR HAVE LET YOURSELF OR YOUR FAMILY DOWN: 1
2. FEELING DOWN, DEPRESSED OR HOPELESS: 3
8. MOVING OR SPEAKING SO SLOWLY THAT OTHER PEOPLE COULD HAVE NOTICED. OR THE OPPOSITE, BEING SO FIGETY OR RESTLESS THAT YOU HAVE BEEN MOVING AROUND A LOT MORE THAN USUAL: 0
SUM OF ALL RESPONSES TO PHQ QUESTIONS 1-9: 12
SUM OF ALL RESPONSES TO PHQ QUESTIONS 1-9: 12

## 2020-03-12 NOTE — PROGRESS NOTES
[facility] RESIDENCY PROGRAM   OFFICE PROGRESS NOTE  DATE OF VISIT : 3/15/2020    Patient : Radha Gibson   Sex : maleAge : 31 y.o.  : 1995   MRN : [id number]            Chief Complaint :   Chief Complaint   Patient presents with    Follow-Up from Hospital    Shortness of Breath       History of Present Illness : Katheleen [place]  31 y.o. who presented to the office today for ER follow-up. He states he has noted shortness of breath intermittently which he describes as coughing and getting tired, he states he also has noted pain mainly in his ankles and shoulders, generalized body pain towards the end of the day or after physical exertion. States all the symptoms started approximately 10 days ago, he went to ER recently and was found to have elevated CK was given hydration and discharged home. He denies taking any medication currently for any chronic medical issues. He was taking ibuprofen which did not help so he did stop taking them. He stresses that his pain and tiredness have been interfering with his sleep. He states years ago he had bilateral ankle fracture and never received physical therapy for that and believes his ankle pain is disabling. He also shares that intermittently he drinks whiskey, and also uses marijuana to help his symptoms. He states that he have been trying to work on, and 10 days ago also did kickboxing. He shares his life is stressful and he has not been able to hold a job for a long time his last job he lost because of shoulder pain. He also has to take care of his wife and his child states all this is taxing he is worried about paying bills and providing for family. He shares when he was a teenager he used to get angry and people thought he has some psychiatric condition and he was diagnosed as schizophrenia in recent years he has not taken any medication and shares that he does not have schizophrenia or any mental disorder. Because of being diagnosed with schizophrenia it has been difficult for him to get a job people have always judged him because of the previous diagnosis. Denies fever nausea vomiting. States he has not been able to eat and drink because of poor appetite and past 2 days. Past Medical History :  has a past medical history of Anxiety attack, Dislocation of shoulder, and Schizophrenia ([facility] [address] Past Surgical history :    Past Surgical History:   Procedure Laterality Date    DENTAL SURGERY      SHOULDER ARTHROSCOPY Right 6/13/2019    RIGHT SHOULDER ARTHROSCOPY LABRAL REPAIR  CAPSULORRHAPHY, SUBACROMIAL DECOMPRESSION performed by Jojo Srinivasan MD at [address] [place] [address] History : No family history on file.     Social History :   Social History     Socioeconomic History    Marital status:      Spouse name: Not on file    Number of children: Not on file    Years of education: Not on file    Highest education level: Not on file   Occupational History    Not on file   Social Needs    Financial resource strain: Not on file    Food insecurity     Worry: Not on file     Inability: Not on file    Transportation needs     Medical: Not on file     Non-medical: Not on file   Tobacco Use    Smoking status: Never Smoker    Smokeless tobacco: Never Used   Substance and Sexual Activity    Alcohol use: No     Comment: denies at this time; patient states rarely on 9/18/19    Drug use: No    Sexual activity: Not on file   Lifestyle    Physical activity     Days per week: Not on file     Minutes per session: Not on file    Stress: Not on file   Relationships    Social connections     Talks on phone: Not on file     Gets together: Not on file     Attends Hinduism service: Not on file     Active member of club or organization: Not on file     Attends meetings of clubs or organizations: Not on file     Relationship status: Not on file    Intimate partner violence     Fear of current or ex partner:

## 2020-03-14 ENCOUNTER — APPOINTMENT (OUTPATIENT)
Dept: GENERAL RADIOLOGY | Age: 25
DRG: 351 | End: 2020-03-14
Payer: MEDICAID

## 2020-03-14 ENCOUNTER — APPOINTMENT (OUTPATIENT)
Dept: CT IMAGING | Age: 25
DRG: 351 | End: 2020-03-14
Payer: MEDICAID

## 2020-03-14 ENCOUNTER — HOSPITAL ENCOUNTER (INPATIENT)
Age: 25
LOS: 3 days | Discharge: HOME | DRG: 351 | End: 2020-03-17
Attending: EMERGENCY MEDICINE | Admitting: INTERNAL MEDICINE
Payer: MEDICAID

## 2020-03-14 PROBLEM — M62.82 RHABDOMYOLYSIS: Status: ACTIVE | Noted: 2020-03-14

## 2020-03-14 LAB
ANION GAP SERPL CALCULATED.3IONS-SCNC: 15 MMOL/L (ref 7–16)
BASOPHILS ABSOLUTE: 0.03 E9/L (ref 0–0.2)
BASOPHILS RELATIVE PERCENT: 0.7 % (ref 0–2)
BUN BLDV-MCNC: 12 MG/DL (ref 6–20)
CALCIUM SERPL-MCNC: 9.6 MG/DL (ref 8.6–10.2)
CHLORIDE BLD-SCNC: 102 MMOL/L (ref 98–107)
CO2: 20 MMOL/L (ref 22–29)
CREAT SERPL-MCNC: 1.3 MG/DL (ref 0.7–1.2)
EOSINOPHILS ABSOLUTE: 0.04 E9/L (ref 0.05–0.5)
EOSINOPHILS RELATIVE PERCENT: 1 % (ref 0–6)
GFR AFRICAN AMERICAN: >60
GFR NON-AFRICAN AMERICAN: >60 ML/MIN/1.73
GLUCOSE BLD-MCNC: 94 MG/DL (ref 74–99)
HCT VFR BLD CALC: 43.2 % (ref 37–54)
HEMOGLOBIN: 14.1 G/DL (ref 12.5–16.5)
IMMATURE GRANULOCYTES #: 0.01 E9/L
IMMATURE GRANULOCYTES %: 0.2 % (ref 0–5)
LYMPHOCYTES ABSOLUTE: 1.17 E9/L (ref 1.5–4)
LYMPHOCYTES RELATIVE PERCENT: 28.7 % (ref 20–42)
MCH RBC QN AUTO: 26.5 PG (ref 26–35)
MCHC RBC AUTO-ENTMCNC: 32.6 % (ref 32–34.5)
MCV RBC AUTO: 81.1 FL (ref 80–99.9)
MONOCYTES ABSOLUTE: 0.35 E9/L (ref 0.1–0.95)
MONOCYTES RELATIVE PERCENT: 8.6 % (ref 2–12)
NEUTROPHILS ABSOLUTE: 2.48 E9/L (ref 1.8–7.3)
NEUTROPHILS RELATIVE PERCENT: 60.8 % (ref 43–80)
PDW BLD-RTO: 13.5 FL (ref 11.5–15)
PLATELET # BLD: 222 E9/L (ref 130–450)
PMV BLD AUTO: 10.9 FL (ref 7–12)
POTASSIUM SERPL-SCNC: 4.3 MMOL/L (ref 3.5–5)
RBC # BLD: 5.33 E12/L (ref 3.8–5.8)
SODIUM BLD-SCNC: 137 MMOL/L (ref 132–146)
TOTAL CK: 2777 U/L (ref 20–200)
TROPONIN: <0.01 NG/ML (ref 0–0.03)
WBC # BLD: 4.1 E9/L (ref 4.5–11.5)

## 2020-03-14 PROCEDURE — 99222 1ST HOSP IP/OBS MODERATE 55: CPT | Performed by: INTERNAL MEDICINE

## 2020-03-14 PROCEDURE — 71046 X-RAY EXAM CHEST 2 VIEWS: CPT

## 2020-03-14 PROCEDURE — 85025 COMPLETE CBC W/AUTO DIFF WBC: CPT

## 2020-03-14 PROCEDURE — 82550 ASSAY OF CK (CPK): CPT

## 2020-03-14 PROCEDURE — 84484 ASSAY OF TROPONIN QUANT: CPT

## 2020-03-14 PROCEDURE — 36415 COLL VENOUS BLD VENIPUNCTURE: CPT

## 2020-03-14 PROCEDURE — 2580000003 HC RX 258: Performed by: PREVENTIVE MEDICINE

## 2020-03-14 PROCEDURE — 80048 BASIC METABOLIC PNL TOTAL CA: CPT

## 2020-03-14 PROCEDURE — 93005 ELECTROCARDIOGRAM TRACING: CPT | Performed by: PREVENTIVE MEDICINE

## 2020-03-14 PROCEDURE — 1200000000 HC SEMI PRIVATE

## 2020-03-14 PROCEDURE — 99284 EMERGENCY DEPT VISIT MOD MDM: CPT

## 2020-03-14 PROCEDURE — 70450 CT HEAD/BRAIN W/O DYE: CPT

## 2020-03-14 RX ORDER — POLYETHYLENE GLYCOL 3350 17 G/17G
17 POWDER, FOR SOLUTION ORAL DAILY PRN
Status: DISCONTINUED | OUTPATIENT
Start: 2020-03-14 | End: 2020-03-17 | Stop reason: HOSPADM

## 2020-03-14 RX ORDER — 0.9 % SODIUM CHLORIDE 0.9 %
1000 INTRAVENOUS SOLUTION INTRAVENOUS ONCE
Status: COMPLETED | OUTPATIENT
Start: 2020-03-14 | End: 2020-03-14

## 2020-03-14 RX ORDER — SODIUM CHLORIDE 9 MG/ML
INJECTION, SOLUTION INTRAVENOUS ONCE
Status: COMPLETED | OUTPATIENT
Start: 2020-03-14 | End: 2020-03-14

## 2020-03-14 RX ORDER — PROMETHAZINE HYDROCHLORIDE 25 MG/1
12.5 TABLET ORAL EVERY 6 HOURS PRN
Status: DISCONTINUED | OUTPATIENT
Start: 2020-03-14 | End: 2020-03-17 | Stop reason: HOSPADM

## 2020-03-14 RX ORDER — SODIUM CHLORIDE 0.9 % (FLUSH) 0.9 %
10 SYRINGE (ML) INJECTION PRN
Status: DISCONTINUED | OUTPATIENT
Start: 2020-03-14 | End: 2020-03-17 | Stop reason: HOSPADM

## 2020-03-14 RX ORDER — SODIUM CHLORIDE 0.9 % (FLUSH) 0.9 %
10 SYRINGE (ML) INJECTION EVERY 12 HOURS SCHEDULED
Status: DISCONTINUED | OUTPATIENT
Start: 2020-03-15 | End: 2020-03-17 | Stop reason: HOSPADM

## 2020-03-14 RX ORDER — ACETAMINOPHEN 650 MG/1
650 SUPPOSITORY RECTAL EVERY 6 HOURS PRN
Status: DISCONTINUED | OUTPATIENT
Start: 2020-03-14 | End: 2020-03-17 | Stop reason: HOSPADM

## 2020-03-14 RX ORDER — ONDANSETRON 2 MG/ML
4 INJECTION INTRAMUSCULAR; INTRAVENOUS EVERY 6 HOURS PRN
Status: DISCONTINUED | OUTPATIENT
Start: 2020-03-14 | End: 2020-03-17 | Stop reason: HOSPADM

## 2020-03-14 RX ORDER — ACETAMINOPHEN 325 MG/1
650 TABLET ORAL EVERY 6 HOURS PRN
Status: DISCONTINUED | OUTPATIENT
Start: 2020-03-14 | End: 2020-03-17 | Stop reason: HOSPADM

## 2020-03-14 RX ADMIN — SODIUM CHLORIDE 1000 ML: 9 INJECTION, SOLUTION INTRAVENOUS at 20:39

## 2020-03-14 RX ADMIN — SODIUM CHLORIDE: 9 INJECTION, SOLUTION INTRAVENOUS at 23:02

## 2020-03-14 ASSESSMENT — ENCOUNTER SYMPTOMS
NAUSEA: 0
DIARRHEA: 0
ALLERGIC/IMMUNOLOGIC NEGATIVE: 1
CONSTIPATION: 0
ABDOMINAL PAIN: 0
COUGH: 0
VOMITING: 0
SHORTNESS OF BREATH: 0
CHEST TIGHTNESS: 0

## 2020-03-14 NOTE — ED PROVIDER NOTES
External ear normal.      Nose: Nose normal.      Mouth/Throat:      Pharynx: No oropharyngeal exudate. Comments: There are no bite marks  Eyes:      General:         Right eye: No discharge. Left eye: No discharge. Conjunctiva/sclera: Conjunctivae normal.      Pupils: Pupils are equal, round, and reactive to light. Neck:      Musculoskeletal: Normal range of motion and neck supple. Thyroid: No thyromegaly. Vascular: No JVD. Trachea: No tracheal deviation. Cardiovascular:      Rate and Rhythm: Normal rate and regular rhythm. Heart sounds: Normal heart sounds. No murmur. No friction rub. No gallop. Pulmonary:      Effort: Pulmonary effort is normal. No respiratory distress. Breath sounds: Normal breath sounds. No wheezing or rales. Abdominal:      General: Bowel sounds are normal. There is no distension. Palpations: Abdomen is soft. Tenderness: There is no abdominal tenderness. There is no guarding or rebound. Musculoskeletal: Normal range of motion. Lymphadenopathy:      Cervical: No cervical adenopathy. Skin:     General: Skin is warm and dry. Neurological:      Mental Status: He is alert and oriented to person, place, and time. Psychiatric:         Behavior: Behavior normal.         Thought Content: Thought content normal.         Judgment: Judgment normal.          Procedures     MDM  Number of Diagnoses or Management Options  Non-traumatic rhabdomyolysis:   Diagnosis management comments: 77-year-old male with the above-stated history, signs, symptoms. During patient's emergency department stay he was evaluated for acute causes of his symptoms to include acute intracranial abnormalities, dehydration, cardiac dysrhythmia, rhabdomyolysis. He was found to have a significantly elevated CK level of 2700 and was started on maintenance fluids after IV bolus was given.   He remained asymptomatic while in the emergency department decision was made

## 2020-03-15 ENCOUNTER — APPOINTMENT (OUTPATIENT)
Dept: ULTRASOUND IMAGING | Age: 25
DRG: 351 | End: 2020-03-15
Payer: MEDICAID

## 2020-03-15 PROBLEM — R74.8 ELEVATED CK: Status: ACTIVE | Noted: 2020-03-15

## 2020-03-15 PROBLEM — R53.82 CHRONIC FATIGUE: Status: ACTIVE | Noted: 2020-03-15

## 2020-03-15 LAB
ANION GAP SERPL CALCULATED.3IONS-SCNC: 12 MMOL/L (ref 7–16)
BUN BLDV-MCNC: 10 MG/DL (ref 6–20)
CALCIUM SERPL-MCNC: 8.7 MG/DL (ref 8.6–10.2)
CHLORIDE BLD-SCNC: 108 MMOL/L (ref 98–107)
CO2: 21 MMOL/L (ref 22–29)
CREAT SERPL-MCNC: 1.3 MG/DL (ref 0.7–1.2)
CREATININE URINE: 46 MG/DL (ref 40–278)
EKG ATRIAL RATE: 56 BPM
EKG P AXIS: 67 DEGREES
EKG P-R INTERVAL: 140 MS
EKG Q-T INTERVAL: 398 MS
EKG QRS DURATION: 100 MS
EKG QTC CALCULATION (BAZETT): 384 MS
EKG R AXIS: 82 DEGREES
EKG T AXIS: 45 DEGREES
EKG VENTRICULAR RATE: 56 BPM
GFR AFRICAN AMERICAN: >60
GFR NON-AFRICAN AMERICAN: >60 ML/MIN/1.73
GLUCOSE BLD-MCNC: 97 MG/DL (ref 74–99)
HCT VFR BLD CALC: 38.6 % (ref 37–54)
HEMOGLOBIN: 12.9 G/DL (ref 12.5–16.5)
LV EF: 65 %
LVEF MODALITY: NORMAL
MCH RBC QN AUTO: 27.4 PG (ref 26–35)
MCHC RBC AUTO-ENTMCNC: 33.4 % (ref 32–34.5)
MCV RBC AUTO: 82 FL (ref 80–99.9)
OSMOLALITY URINE: 507 MOSM/KG (ref 300–900)
OSMOLALITY: 304 MOSM/KG (ref 285–310)
PDW BLD-RTO: 13.6 FL (ref 11.5–15)
PLATELET # BLD: 202 E9/L (ref 130–450)
PMV BLD AUTO: 10.9 FL (ref 7–12)
POTASSIUM REFLEX MAGNESIUM: 4.2 MMOL/L (ref 3.5–5)
PROLACTIN: 8.72 NG/ML
RBC # BLD: 4.71 E12/L (ref 3.8–5.8)
SODIUM BLD-SCNC: 141 MMOL/L (ref 132–146)
SODIUM URINE: 203 MMOL/L
WBC # BLD: 4.4 E9/L (ref 4.5–11.5)

## 2020-03-15 PROCEDURE — 83930 ASSAY OF BLOOD OSMOLALITY: CPT

## 2020-03-15 PROCEDURE — 2580000003 HC RX 258: Performed by: INTERNAL MEDICINE

## 2020-03-15 PROCEDURE — 93306 TTE W/DOPPLER COMPLETE: CPT

## 2020-03-15 PROCEDURE — 93010 ELECTROCARDIOGRAM REPORT: CPT | Performed by: INTERNAL MEDICINE

## 2020-03-15 PROCEDURE — 1200000000 HC SEMI PRIVATE

## 2020-03-15 PROCEDURE — 76770 US EXAM ABDO BACK WALL COMP: CPT

## 2020-03-15 PROCEDURE — 84300 ASSAY OF URINE SODIUM: CPT

## 2020-03-15 PROCEDURE — 85027 COMPLETE CBC AUTOMATED: CPT

## 2020-03-15 PROCEDURE — APPSS30 APP SPLIT SHARED TIME 16-30 MINUTES: Performed by: CLINICAL NURSE SPECIALIST

## 2020-03-15 PROCEDURE — 80048 BASIC METABOLIC PNL TOTAL CA: CPT

## 2020-03-15 PROCEDURE — 82570 ASSAY OF URINE CREATININE: CPT

## 2020-03-15 PROCEDURE — 36415 COLL VENOUS BLD VENIPUNCTURE: CPT

## 2020-03-15 PROCEDURE — 83935 ASSAY OF URINE OSMOLALITY: CPT

## 2020-03-15 PROCEDURE — 2580000003 HC RX 258: Performed by: CLINICAL NURSE SPECIALIST

## 2020-03-15 PROCEDURE — 6360000002 HC RX W HCPCS: Performed by: INTERNAL MEDICINE

## 2020-03-15 PROCEDURE — 6370000000 HC RX 637 (ALT 250 FOR IP): Performed by: INTERNAL MEDICINE

## 2020-03-15 PROCEDURE — 87205 SMEAR GRAM STAIN: CPT

## 2020-03-15 PROCEDURE — 84146 ASSAY OF PROLACTIN: CPT

## 2020-03-15 RX ORDER — SODIUM CHLORIDE 9 MG/ML
INJECTION, SOLUTION INTRAVENOUS CONTINUOUS
Status: DISCONTINUED | OUTPATIENT
Start: 2020-03-15 | End: 2020-03-17 | Stop reason: HOSPADM

## 2020-03-15 RX ADMIN — ACETAMINOPHEN 650 MG: 325 TABLET ORAL at 07:59

## 2020-03-15 RX ADMIN — SODIUM CHLORIDE: 9 INJECTION, SOLUTION INTRAVENOUS at 22:38

## 2020-03-15 RX ADMIN — SODIUM CHLORIDE, PRESERVATIVE FREE 10 ML: 5 INJECTION INTRAVENOUS at 08:01

## 2020-03-15 RX ADMIN — SODIUM CHLORIDE: 9 INJECTION, SOLUTION INTRAVENOUS at 12:13

## 2020-03-15 RX ADMIN — ENOXAPARIN SODIUM 40 MG: 40 INJECTION SUBCUTANEOUS at 08:01

## 2020-03-15 ASSESSMENT — PAIN SCALES - GENERAL
PAINLEVEL_OUTOF10: 7
PAINLEVEL_OUTOF10: 6
PAINLEVEL_OUTOF10: 0
PAINLEVEL_OUTOF10: 8
PAINLEVEL_OUTOF10: 8

## 2020-03-15 ASSESSMENT — PAIN DESCRIPTION - ORIENTATION
ORIENTATION: RIGHT;LEFT
ORIENTATION: RIGHT;LEFT;UPPER

## 2020-03-15 ASSESSMENT — PAIN DESCRIPTION - PROGRESSION
CLINICAL_PROGRESSION: NOT CHANGED
CLINICAL_PROGRESSION: NOT CHANGED

## 2020-03-15 ASSESSMENT — PAIN DESCRIPTION - LOCATION
LOCATION: LEG
LOCATION: LEG

## 2020-03-15 ASSESSMENT — PAIN DESCRIPTION - DESCRIPTORS
DESCRIPTORS: ACHING
DESCRIPTORS: ACHING

## 2020-03-15 ASSESSMENT — PAIN DESCRIPTION - FREQUENCY
FREQUENCY: CONTINUOUS
FREQUENCY: CONTINUOUS

## 2020-03-15 ASSESSMENT — PAIN DESCRIPTION - ONSET: ONSET: ON-GOING

## 2020-03-15 ASSESSMENT — PAIN DESCRIPTION - PAIN TYPE
TYPE: ACUTE PAIN
TYPE: ACUTE PAIN

## 2020-03-15 NOTE — PROGRESS NOTES
[facility] Progress Note    Admitting Date and Time: 3/14/2020  5:55 PM  Admit Dx: Rhabdomyolysis [M62.82]  Rhabdomyolysis [M62.82]    Subjective:  Patient is being followed for Rhabdomyolysis [M62.82]  Rhabdomyolysis [M62.82]   Pt feels little bit fatigued with some lower extremity muscle cramps and aches. Little bit of abdominal pressure and flank pressure. Voiding well no constipation. No respiratory distress. No nausea vomiting or diarrhea. Per RN: No adverse reactions reported to me. ROS: denies fever, chills, cp, sob, n/v, HA unless stated above.       sodium chloride flush  10 mL Intravenous 2 times per day    enoxaparin  40 mg Subcutaneous Daily     sodium chloride flush, 10 mL, PRN  acetaminophen, 650 mg, Q6H PRN    Or  acetaminophen, 650 mg, Q6H PRN  polyethylene glycol, 17 g, Daily PRN  promethazine, 12.5 mg, Q6H PRN    Or  ondansetron, 4 mg, Q6H PRN         Objective:    /64   Pulse 56   Temp 97.6 °F (36.4 °C) (Oral)   Resp 16   Ht 6' (1.829 m)   Wt 182 lb 1 oz (82.6 kg)   SpO2 100%   BMI 24.69 kg/m²     General Appearance: alert and oriented to person, place and time and in no acute distress  Skin: warm and dry  Head: normocephalic and atraumatic  Eyes: pupils equal, round, and reactive to light, extraocular eye movements intact, conjunctivae normal  Neck: neck supple and non tender without mass   Pulmonary/Chest: clear to auscultation bilaterally- no wheezes, rales or rhonchi, normal air movement, no respiratory distress  Cardiovascular: normal rate, normal S1 and S2 and no carotid bruits  Abdomen: soft, non-tender, non-distended, normal bowel sounds, no masses or organomegaly  Extremities: no cyanosis, no clubbing and no edema  Neurologic: no cranial nerve deficit and speech normal        Recent Labs     03/14/20  1853 03/15/20  0425    141   K 4.3 4.2    108*   CO2 20* 21*   BUN 12 10   CREATININE 1.3* 1.3*   GLUCOSE 94 97   CALCIUM 9.6 8.7 Patient tells me that brother seen him somewhat slumped over in bed. Patient tells me he has been having dizziness and fatigue for a couple of months on and off. He laid down yesterday in bed after feeling lightheaded.  -Check 2D echocardiogram  -Check orthostatic blood pressure. HIV sent, morning CBC with differential, CMP, repeat CK trend. TSH pending      CODE STATUS: Full  DVT prophylaxis Lovenox      NOTE: This report was transcribed using voice recognition software. Every effort was made to ensure accuracy; however, inadvertent computerized transcription errors may be present. Electronically signed by ADRIA Lozano on 3/15/2020 at 8:22 AM    Addendum: I have personally participated in the history, exam, medical decision making with Gaviota Ortiz NP on the date of service, I have personally reviewed imaging and lab data as well as EKG and I agree with all of the pertinent clinical information unless otherwise noted. I have also reviewed and agree with the past medical, family, and social history unless otherwise noted. PHYSICAL EXAM:  Vitals:  /60   Pulse 56   Temp 97.6 °F (36.4 °C) (Oral)   Resp 16   Ht 6' (1.829 m)   Wt 182 lb 1 oz (82.6 kg)   SpO2 100%   BMI 24.69 kg/m²     Gen: NAD, AAOx3, sitting in bed  Neuro: No focal neuro deficits, CN II-XII grossly intact  HEENT: NCAT  CV: RRR, no m/r/g  Resp: Equal chest rise b/l, CTAB  Abd: Soft, NT, ND  Ext: Good ROM of b/l upper and lower extremities, no BLE edema; mild tenderness to palpation of b/l thighs    Impression:  Active Problems:    Rhabdomyolysis  Resolved Problems:    * No resolved hospital problems. *      My findings/plan include:    Rhabdomyolysis with current CK of 2,777. The patient's last extreme exercise was 4 days ago where he performed kickboxing for 2 hours.  However, the patient states he could be sitting on the couch for 8 hours at a time and then when he gets up, he feels as if he has done a complete

## 2020-03-15 NOTE — H&P
[facility] History and Physical      CHIEF COMPLAINT: Fatigue and passing out. History of Present Illness: This is 58-year-old -American male with past medical history of anxiety 8 days depression, recent left rotator cuff surgery came from home due to extreme fatigue and passing out. History taken from the patient at the bedside, he is saying he is feeling extremely weak and almost  passing out but he did not pass out as per patient. He had right rotator cuff surgery and he had a lot of pain offered right shoulder due to the surgery he received physical therapy and he was taking ibuprofen and neck Peroxin for this pain. Recently he lost his job due to right shoulder pain and he cannot do physical work. He denies any chest pain, shortness of breath, abdominal pain or any muscle pain. In ER work-up his creatinine 1.3, CPK 2777, his tox screen urine shows cannabinoids. Informant(s) for H&P:Patient    REVIEW OF SYSTEMS:  A comprehensive review of systems was negative except for: what is in the HPI      PMH:  Past Medical History:   Diagnosis Date    Anxiety attack     Dislocation of shoulder     right    Schizophrenia [facility])        Surgical History:  Past Surgical History:   Procedure Laterality Date    DENTAL SURGERY      SHOULDER ARTHROSCOPY Right 6/13/2019    RIGHT SHOULDER ARTHROSCOPY LABRAL REPAIR  CAPSULORRHAPHY, SUBACROMIAL DECOMPRESSION performed by Alexandre Sahu MD at [facility] OR       Medications Prior to Admission:    Prior to Admission medications    Medication Sig Start Date End Date Taking?  Authorizing Provider   naproxen (NAPROSYN) 500 MG tablet Take 1 tablet by mouth 2 times daily Please alternate this with Tylenol and take it with food  Patient not taking: Reported on 3/12/2020 1/21/20   Daniel Cheung PA-C   ibuprofen (ADVIL;MOTRIN) 800 MG tablet Take 1 tablet by mouth every 6 hours as needed for Pain  Patient not taking: Reported on 3/12/2020 11/14/19   Shobha Wilder MD       Allergies:    Depakote [divalproex sodium]    Social History:    reports that he has never smoked. He has never used smokeless tobacco. He reports that he does not drink alcohol or use drugs. Family History:   family history is not on file. His Grandma had heart disease. PHYSICAL EXAM:  Vitals:  /66   Pulse 66   Temp 97.5 °F (36.4 °C) (Oral)   Resp 16   Ht 6' (1.829 m)   Wt 180 lb (81.6 kg)   SpO2 98%   BMI 24.41 kg/m²     General Appearance: alert and oriented to person, place and time and in no acute distress  Skin: warm and dry  Head: normocephalic and atraumatic  Eyes: pupils equal, round, and reactive to light, extraocular eye movements intact, conjunctivae normal  Neck: neck supple and non tender without mass   Pulmonary/Chest: clear to auscultation bilaterally- no wheezes, rales or rhonchi, normal air movement, no respiratory distress  Cardiovascular: normal rate, normal S1 and S2 and no carotid bruits  Abdomen: soft, non-tender, non-distended, normal bowel sounds, no masses or organomegaly  Extremities: no cyanosis, no clubbing and no edema  Neurologic: no cranial nerve deficit and speech normal        LABS:  Recent Labs     03/14/20  1853      K 4.3      CO2 20*   BUN 12   CREATININE 1.3*   GLUCOSE 94   CALCIUM 9.6       Recent Labs     03/14/20  1853   WBC 4.1*   RBC 5.33   HGB 14.1   HCT 43.2   MCV 81.1   MCH 26.5   MCHC 32.6   RDW 13.5      MPV 10.9       No results for input(s): POCGLU in the last 72 hours. Radiology:   XR CHEST STANDARD (2 VW)   Final Result   No acute cardiopulmonary findings. CT Head WO Contrast   Final Result      No evidence of acute intracranial hemorrhage or edema. EKG: NSR    ASSESSMENT:      Active Problems:    Rhabdomyolysis  Resolved Problems:    * No resolved hospital problems. *      PLAN:    1.   Rhabdomyolysis: Continue IV normal saline 120 mL/h follow-up

## 2020-03-16 LAB
ALBUMIN SERPL-MCNC: 4 G/DL (ref 3.5–5.2)
ALP BLD-CCNC: 68 U/L (ref 40–129)
ALT SERPL-CCNC: 26 U/L (ref 0–40)
ANION GAP SERPL CALCULATED.3IONS-SCNC: 9 MMOL/L (ref 7–16)
AST SERPL-CCNC: 32 U/L (ref 0–39)
BASOPHILS ABSOLUTE: 0.04 E9/L (ref 0–0.2)
BASOPHILS RELATIVE PERCENT: 1.1 % (ref 0–2)
BILIRUB SERPL-MCNC: 0.2 MG/DL (ref 0–1.2)
BUN BLDV-MCNC: 10 MG/DL (ref 6–20)
CALCIUM SERPL-MCNC: 8.6 MG/DL (ref 8.6–10.2)
CHLORIDE BLD-SCNC: 105 MMOL/L (ref 98–107)
CO2: 23 MMOL/L (ref 22–29)
CREAT SERPL-MCNC: 1.3 MG/DL (ref 0.7–1.2)
EOSINOPHIL, URINE: 0 % (ref 0–1)
EOSINOPHILS ABSOLUTE: 0.08 E9/L (ref 0.05–0.5)
EOSINOPHILS RELATIVE PERCENT: 2.2 % (ref 0–6)
GFR AFRICAN AMERICAN: >60
GFR NON-AFRICAN AMERICAN: >60 ML/MIN/1.73
GLUCOSE BLD-MCNC: 99 MG/DL (ref 74–99)
HCT VFR BLD CALC: 41.6 % (ref 37–54)
HEMOGLOBIN: 13.2 G/DL (ref 12.5–16.5)
IMMATURE GRANULOCYTES #: 0.01 E9/L
IMMATURE GRANULOCYTES %: 0.3 % (ref 0–5)
LYMPHOCYTES ABSOLUTE: 1.62 E9/L (ref 1.5–4)
LYMPHOCYTES RELATIVE PERCENT: 43.9 % (ref 20–42)
MCH RBC QN AUTO: 26.3 PG (ref 26–35)
MCHC RBC AUTO-ENTMCNC: 31.7 % (ref 32–34.5)
MCV RBC AUTO: 83 FL (ref 80–99.9)
MONOCYTES ABSOLUTE: 0.31 E9/L (ref 0.1–0.95)
MONOCYTES RELATIVE PERCENT: 8.4 % (ref 2–12)
NEUTROPHILS ABSOLUTE: 1.63 E9/L (ref 1.8–7.3)
NEUTROPHILS RELATIVE PERCENT: 44.1 % (ref 43–80)
OSMOLALITY: 294 MOSM/KG (ref 285–310)
PDW BLD-RTO: 13.8 FL (ref 11.5–15)
PLATELET # BLD: 211 E9/L (ref 130–450)
PMV BLD AUTO: 11 FL (ref 7–12)
POTASSIUM SERPL-SCNC: 4.3 MMOL/L (ref 3.5–5)
RBC # BLD: 5.01 E12/L (ref 3.8–5.8)
SODIUM BLD-SCNC: 137 MMOL/L (ref 132–146)
TOTAL CK: 1166 U/L (ref 20–200)
TOTAL PROTEIN: 6.5 G/DL (ref 6.4–8.3)
WBC # BLD: 3.7 E9/L (ref 4.5–11.5)

## 2020-03-16 PROCEDURE — 2580000003 HC RX 258: Performed by: INTERNAL MEDICINE

## 2020-03-16 PROCEDURE — 99233 SBSQ HOSP IP/OBS HIGH 50: CPT | Performed by: INTERNAL MEDICINE

## 2020-03-16 PROCEDURE — 6370000000 HC RX 637 (ALT 250 FOR IP): Performed by: INTERNAL MEDICINE

## 2020-03-16 PROCEDURE — 36415 COLL VENOUS BLD VENIPUNCTURE: CPT

## 2020-03-16 PROCEDURE — 80053 COMPREHEN METABOLIC PANEL: CPT

## 2020-03-16 PROCEDURE — 1200000000 HC SEMI PRIVATE

## 2020-03-16 PROCEDURE — 83930 ASSAY OF BLOOD OSMOLALITY: CPT

## 2020-03-16 PROCEDURE — 6360000002 HC RX W HCPCS: Performed by: INTERNAL MEDICINE

## 2020-03-16 PROCEDURE — 85025 COMPLETE CBC W/AUTO DIFF WBC: CPT

## 2020-03-16 PROCEDURE — 82550 ASSAY OF CK (CPK): CPT

## 2020-03-16 RX ADMIN — SODIUM CHLORIDE: 9 INJECTION, SOLUTION INTRAVENOUS at 14:00

## 2020-03-16 RX ADMIN — SODIUM CHLORIDE: 9 INJECTION, SOLUTION INTRAVENOUS at 06:25

## 2020-03-16 RX ADMIN — ENOXAPARIN SODIUM 40 MG: 40 INJECTION SUBCUTANEOUS at 10:55

## 2020-03-16 RX ADMIN — ACETAMINOPHEN 650 MG: 325 TABLET ORAL at 19:02

## 2020-03-16 ASSESSMENT — PAIN DESCRIPTION - LOCATION: LOCATION: LEG

## 2020-03-16 ASSESSMENT — PAIN SCALES - GENERAL
PAINLEVEL_OUTOF10: 6
PAINLEVEL_OUTOF10: 0
PAINLEVEL_OUTOF10: 0
PAINLEVEL_OUTOF10: 7

## 2020-03-16 ASSESSMENT — PAIN DESCRIPTION - PAIN TYPE: TYPE: ACUTE PAIN

## 2020-03-16 ASSESSMENT — PAIN DESCRIPTION - ONSET: ONSET: ON-GOING

## 2020-03-16 ASSESSMENT — PAIN DESCRIPTION - DESCRIPTORS: DESCRIPTORS: ACHING;CRAMPING;DISCOMFORT

## 2020-03-16 ASSESSMENT — PAIN DESCRIPTION - ORIENTATION: ORIENTATION: RIGHT;LEFT

## 2020-03-16 ASSESSMENT — PAIN DESCRIPTION - FREQUENCY: FREQUENCY: CONTINUOUS

## 2020-03-16 NOTE — PLAN OF CARE
Problem: Falls - Risk of:  Goal: Will remain free from falls  Description: Will remain free from falls  3/16/2020 1345 by Becky Fair RN  Outcome: Met This Shift     Problem: Falls - Risk of:  Goal: Absence of physical injury  Description: Absence of physical injury  3/16/2020 1345 by Becky Fair RN  Outcome: Met This Shift

## 2020-03-16 NOTE — PROGRESS NOTES
[facility] Progress Note    Admitting Date and Time: 3/14/2020  5:55 PM  Admit Dx: Rhabdomyolysis [M62.82]  Rhabdomyolysis [M62.82]    Subjective:  Patient is being followed for Rhabdomyolysis [M62.82]  Rhabdomyolysis [M62.82]     The patient states he is feeling well. He admits to good pain control. He denies any fevers or chills.  sodium chloride flush  10 mL Intravenous 2 times per day    enoxaparin  40 mg Subcutaneous Daily     sodium chloride flush, 10 mL, PRN  acetaminophen, 650 mg, Q6H PRN    Or  acetaminophen, 650 mg, Q6H PRN  polyethylene glycol, 17 g, Daily PRN  promethazine, 12.5 mg, Q6H PRN    Or  ondansetron, 4 mg, Q6H PRN         Objective:    /76   Pulse 56   Temp 98 °F (36.7 °C) (Oral)   Resp 16   Ht 6' (1.829 m)   Wt 188 lb 7 oz (85.5 kg)   SpO2 99%   BMI 25.56 kg/m²     Gen: NAD, AAOx3, sitting in bed  HEENT: NCAT  CV: RRR, no m/r/g  Resp: Equal chest rise b/l, CTAB  Abd: Soft, NT, ND  Ext: Good ROM of b/l upper and lower extremities, no BLE edema      Recent Labs     03/14/20  1853 03/15/20  0425 03/16/20  0430    141 137   K 4.3 4.2 4.3    108* 105   CO2 20* 21* 23   BUN 12 10 10   CREATININE 1.3* 1.3* 1.3*   GLUCOSE 94 97 99   CALCIUM 9.6 8.7 8.6       Recent Labs     03/14/20  1853 03/15/20  0425 03/16/20  0430   WBC 4.1* 4.4* 3.7*   RBC 5.33 4.71 5.01   HGB 14.1 12.9 13.2   HCT 43.2 38.6 41.6   MCV 81.1 82.0 83.0   MCH 26.5 27.4 26.3   MCHC 32.6 33.4 31.7*   RDW 13.5 13.6 13.8    202 211   MPV 10.9 10.9 11.0     Assessment:    Active Problems:    Rhabdomyolysis  Resolved Problems:    * No resolved hospital problems. *  CKD    Plan:    Rhabdomyolysis, CK currently 1,166 from 2,777. Continue to monitor. Continue IV fluids. It is very possible that the patient is experiencing seizures when he \"locks up\" and \"blacks out\". ECHO normal.  CKD with baseline creatinine of 1.3 since 2014.   He will need to see a neurologist as well as a nephrologist as an outpatient. Possible discharge tomorrow if CK continues to decline and the patient is hemodynamically stable. NOTE: This report was transcribed using voice recognition software. Every effort was made to ensure accuracy; however, inadvertent computerized transcription errors may be present.   Electronically signed by Félix Finn MD on 3/16/2020 at 3:42 PM

## 2020-03-17 VITALS
HEART RATE: 62 BPM | OXYGEN SATURATION: 100 % | TEMPERATURE: 97.7 F | SYSTOLIC BLOOD PRESSURE: 131 MMHG | RESPIRATION RATE: 16 BRPM | HEIGHT: 72 IN | DIASTOLIC BLOOD PRESSURE: 58 MMHG | BODY MASS INDEX: 25.87 KG/M2 | WEIGHT: 191 LBS

## 2020-03-17 LAB — TOTAL CK: 729 U/L (ref 20–200)

## 2020-03-17 PROCEDURE — 36415 COLL VENOUS BLD VENIPUNCTURE: CPT

## 2020-03-17 PROCEDURE — 6360000002 HC RX W HCPCS: Performed by: INTERNAL MEDICINE

## 2020-03-17 PROCEDURE — 82550 ASSAY OF CK (CPK): CPT

## 2020-03-17 PROCEDURE — 99239 HOSP IP/OBS DSCHRG MGMT >30: CPT | Performed by: INTERNAL MEDICINE

## 2020-03-17 RX ADMIN — ENOXAPARIN SODIUM 40 MG: 40 INJECTION SUBCUTANEOUS at 07:46

## 2020-03-17 ASSESSMENT — PAIN SCALES - GENERAL
PAINLEVEL_OUTOF10: 0
PAINLEVEL_OUTOF10: 0

## 2020-03-17 NOTE — PLAN OF CARE
Problem: Falls - Risk of:  Goal: Will remain free from falls  Description: Will remain free from falls  Outcome: Met This Shift     Problem: Pain:  Goal: Pain level will decrease  Description: Pain level will decrease  Outcome: Met This Shift

## 2020-03-17 NOTE — PROGRESS NOTES
[facility] Quality Flow/Interdisciplinary Rounds Progress Note        Quality Flow Rounds held on March 17, 2020    Disciplines Attending:  Bedside Nurse, ,  and Nursing Unit Leadership    Fabian Lynch was admitted on 3/14/2020  5:55 PM    Anticipated Discharge Date:  Expected Discharge Date: 03/18/20    Disposition:    Ryan Score:  Ryan Scale Score: 21    Readmission Risk              Risk of Unplanned Readmission:        7           Discussed patient goal for the day, patient clinical progression, and barriers to discharge.   The following Goal(s) of the Day/Commitment(s) have been identified:  d/c planning      Benson Sierra  March 17, 2020

## 2020-03-17 NOTE — PROGRESS NOTES
Notified house physician of CK result. She will put in d/c orders after she comes up to the floor and sees patient.

## 2020-03-18 ENCOUNTER — TELEPHONE (OUTPATIENT)
Dept: FAMILY MEDICINE CLINIC | Age: 25
End: 2020-03-18

## 2020-03-19 NOTE — TELEPHONE ENCOUNTER
[facility] Trans[facility] Initial Follow Up Call    Outreach made within 2 business days of discharge: Yes    Patient: Echo Wiley Patient : 1995   MRN: [id number]  Reason for Admission: There are no discharge diagnoses documented for the most recent discharge. Discharge Date: 3/17/20       Spoke with: Patient     Discharge department/facility: [facility] Interactive Patient Contact:  Was patient able to fill all prescriptions: No: will fill the ibuprofen today  Was patient instructed to bring all medications to the follow-up visit: Yes taking some ibuprofen  800 mg at home. Is patient taking all medications as directed in the discharge summary? Yes  Does patient understand their discharge instructions: Yes  Does patient have questions or concerns that need addressed prior to 7-14 day follow up office visit: yes - can not find his ID kolby thinks he left it in the room or at the ED, gave him the phone phone number for security.      Scheduled appointment with PCP within 7-14 days yes scheduled patient for 3/20/2020 < 7days     Follow Up  Future Appointments   Date Time Provider Sunday Kay   3/20/2020  9:00 AM Lopez Gallego MD Jassi Graft [facility]   2020  9:20 AM MD Elina Dickeyo [facility]   2020  2:40 PM Oralia Peña MD [facility]       Argelia Cao RN

## 2020-04-07 ENCOUNTER — HOSPITAL ENCOUNTER (OUTPATIENT)
Age: 25
Discharge: HOME | End: 2020-04-09
Payer: MEDICAID

## 2020-04-07 ENCOUNTER — OFFICE VISIT (OUTPATIENT)
Dept: FAMILY MEDICINE CLINIC | Age: 25
End: 2020-04-07
Payer: MEDICAID

## 2020-04-07 VITALS
OXYGEN SATURATION: 99 % | BODY MASS INDEX: 28.34 KG/M2 | HEART RATE: 84 BPM | WEIGHT: 187 LBS | SYSTOLIC BLOOD PRESSURE: 111 MMHG | DIASTOLIC BLOOD PRESSURE: 69 MMHG | TEMPERATURE: 98.5 F | HEIGHT: 68 IN

## 2020-04-07 LAB
ANION GAP SERPL CALCULATED.3IONS-SCNC: 11 MMOL/L (ref 7–16)
BUN BLDV-MCNC: 6 MG/DL (ref 6–20)
CALCIUM SERPL-MCNC: 9.2 MG/DL (ref 8.6–10.2)
CHLORIDE BLD-SCNC: 105 MMOL/L (ref 98–107)
CO2: 22 MMOL/L (ref 22–29)
CREAT SERPL-MCNC: 1.1 MG/DL (ref 0.7–1.2)
GFR AFRICAN AMERICAN: >60
GFR NON-AFRICAN AMERICAN: >60 ML/MIN/1.73
GLUCOSE BLD-MCNC: 100 MG/DL (ref 74–99)
POTASSIUM SERPL-SCNC: 4.3 MMOL/L (ref 3.5–5)
SODIUM BLD-SCNC: 138 MMOL/L (ref 132–146)
TOTAL CK: 336 U/L (ref 20–200)

## 2020-04-07 PROCEDURE — 82550 ASSAY OF CK (CPK): CPT

## 2020-04-07 PROCEDURE — 80048 BASIC METABOLIC PNL TOTAL CA: CPT

## 2020-04-07 PROCEDURE — 1111F DSCHRG MED/CURRENT MED MERGE: CPT | Performed by: FAMILY MEDICINE

## 2020-04-07 PROCEDURE — G8427 DOCREV CUR MEDS BY ELIG CLIN: HCPCS | Performed by: FAMILY MEDICINE

## 2020-04-07 PROCEDURE — 36415 COLL VENOUS BLD VENIPUNCTURE: CPT | Performed by: FAMILY MEDICINE

## 2020-04-07 PROCEDURE — G8419 CALC BMI OUT NRM PARAM NOF/U: HCPCS | Performed by: FAMILY MEDICINE

## 2020-04-07 PROCEDURE — 99212 OFFICE O/P EST SF 10 MIN: CPT | Performed by: STUDENT IN AN ORGANIZED HEALTH CARE EDUCATION/TRAINING PROGRAM

## 2020-04-07 PROCEDURE — 36415 COLL VENOUS BLD VENIPUNCTURE: CPT

## 2020-04-07 PROCEDURE — 99213 OFFICE O/P EST LOW 20 MIN: CPT | Performed by: STUDENT IN AN ORGANIZED HEALTH CARE EDUCATION/TRAINING PROGRAM

## 2020-04-07 PROCEDURE — 1036F TOBACCO NON-USER: CPT | Performed by: FAMILY MEDICINE

## 2020-04-07 NOTE — PROGRESS NOTES
[facility]  FAMILY MEDICINE RESIDENCY PROGRAM   OFFICE PROGRESS NOTE  DATE OF VISIT : 2020    Patient : Oleh Seip   Sex : maleAge : 31 y.o.  : 1995   MRN : [id number]              Chief Complaint :   Chief Complaint   Patient presents with    2 Week Follow-Up       History of Present Illness : Ovidio Morgan  31 y.o. who presented to the office today for a hospital follow up. Patient is a 57-year-old male with recent hospitalization for fatigue, episodes of sudden collapse and elevated CK presents for hospital follow-up. This symptoms have been happening for past 6 months. During past two hospitalizations he was found to be dehydrated and with elevated CK and increased exercise. During hospitalization past  it was suspected that patient might have some form of epileptic disorders and would benefit from neurology evaluation. Patient has upcoming appointment on  with neurology. Since discharge patient has been hydrating well and feels less fatigued more energetic. He also states he did not experience any symptoms of shaking or collapse or loss of consciousness. He denies any fever, vomiting and has been drinking and eating well.         Psychiatric illness history -patient has significant prolonged history and episodes of symptoms concerning for schizophrenia/bipolar disorder. In past he has been taking medication but not currently has history of hospitalization for psychiatric illness. Today patients PHQ 9 score is 24, JANNY score is 24. Patient endorses past episodes of grandiosity high energy with visual and auditory hallucination and delusion. He has experienced episodes when he did require very little sleep and felt energetic alternating with episodes of low mood and depression. Currently he denies hallucinations and delusions. Has not been seeing any psychiatrist or taking medication.   Patient endorses passive suicidal ideation but denies any active plan or intention. Denies history of suicide or suicidal attempts in the past.  No guns at home. Contracts for safety. States wants to live for his wife and daughter. Past Medical History :  has a past medical history of Anxiety attack, Dislocation of shoulder, and Schizophrenia ([facility] [address] Past Surgical history :    Past Surgical History:   Procedure Laterality Date    DENTAL SURGERY      SHOULDER ARTHROSCOPY Right 6/13/2019    RIGHT SHOULDER ARTHROSCOPY LABRAL REPAIR  CAPSULORRHAPHY, SUBACROMIAL DECOMPRESSION performed by Shelley Cornelius MD at [address] [place] [address] History : No family history on file.     Social History :   Social History     Socioeconomic History    Marital status:      Spouse name: Not on file    Number of children: Not on file    Years of education: Not on file    Highest education level: Not on file   Occupational History    Not on file   Social Needs    Financial resource strain: Not on file    Food insecurity     Worry: Not on file     Inability: Not on file    Transportation needs     Medical: Not on file     Non-medical: Not on file   Tobacco Use    Smoking status: Never Smoker    Smokeless tobacco: Never Used   Substance and Sexual Activity    Alcohol use: No     Comment: denies at this time; patient states rarely on 9/18/19    Drug use: No    Sexual activity: Not on file   Lifestyle    Physical activity     Days per week: Not on file     Minutes per session: Not on file    Stress: Not on file   Relationships    Social connections     Talks on phone: Not on file     Gets together: Not on file     Attends Scientologist service: Not on file     Active member of club or organization: Not on file     Attends meetings of clubs or organizations: Not on file     Relationship status: Not on file    Intimate partner violence     Fear of current or ex partner: Not on file     Emotionally abused: Not on file     Physically abused: Not on file     Forced sexual

## 2020-04-09 ASSESSMENT — ENCOUNTER SYMPTOMS
CONSTIPATION: 0
SHORTNESS OF BREATH: 0
EYE REDNESS: 0
COUGH: 0
RHINORRHEA: 0
NAUSEA: 0
EYE DISCHARGE: 0
SORE THROAT: 0
BLOOD IN STOOL: 0
ABDOMINAL PAIN: 0
DIARRHEA: 0
VOMITING: 0

## 2020-05-28 ENCOUNTER — HOSPITAL ENCOUNTER (EMERGENCY)
Age: 25
Discharge: HOME | End: 2020-05-28
Attending: EMERGENCY MEDICINE
Payer: MEDICAID

## 2020-05-28 ENCOUNTER — APPOINTMENT (OUTPATIENT)
Dept: CT IMAGING | Age: 25
End: 2020-05-28
Payer: MEDICAID

## 2020-05-28 VITALS
OXYGEN SATURATION: 97 % | RESPIRATION RATE: 18 BRPM | SYSTOLIC BLOOD PRESSURE: 128 MMHG | BODY MASS INDEX: 28.43 KG/M2 | HEART RATE: 60 BPM | TEMPERATURE: 97 F | WEIGHT: 187 LBS | DIASTOLIC BLOOD PRESSURE: 77 MMHG

## 2020-05-28 LAB
ACETAMINOPHEN LEVEL: <5 MCG/ML (ref 10–30)
ALBUMIN SERPL-MCNC: 4.5 G/DL (ref 3.5–5.2)
ALP BLD-CCNC: 67 U/L (ref 40–129)
ALT SERPL-CCNC: 31 U/L (ref 0–40)
AMPHETAMINE SCREEN, URINE: NOT DETECTED
ANION GAP SERPL CALCULATED.3IONS-SCNC: 14 MMOL/L (ref 7–16)
AST SERPL-CCNC: 23 U/L (ref 0–39)
BACTERIA: NORMAL /HPF
BARBITURATE SCREEN URINE: NOT DETECTED
BASOPHILS ABSOLUTE: 0.02 E9/L (ref 0–0.2)
BASOPHILS RELATIVE PERCENT: 0.5 % (ref 0–2)
BENZODIAZEPINE SCREEN, URINE: NOT DETECTED
BILIRUB SERPL-MCNC: 0.2 MG/DL (ref 0–1.2)
BILIRUBIN URINE: NEGATIVE
BLOOD, URINE: NEGATIVE
BUN BLDV-MCNC: 12 MG/DL (ref 6–20)
CALCIUM SERPL-MCNC: 8.9 MG/DL (ref 8.6–10.2)
CANNABINOID SCREEN URINE: POSITIVE
CHLORIDE BLD-SCNC: 104 MMOL/L (ref 98–107)
CLARITY: CLEAR
CO2: 21 MMOL/L (ref 22–29)
COCAINE METABOLITE SCREEN URINE: NOT DETECTED
COLOR: YELLOW
CREAT SERPL-MCNC: 1.3 MG/DL (ref 0.7–1.2)
EOSINOPHILS ABSOLUTE: 0.03 E9/L (ref 0.05–0.5)
EOSINOPHILS RELATIVE PERCENT: 0.7 % (ref 0–6)
ETHANOL: 15 MG/DL (ref 0–0.08)
FENTANYL SCREEN, URINE: NOT DETECTED
GFR AFRICAN AMERICAN: >60
GFR NON-AFRICAN AMERICAN: >60 ML/MIN/1.73
GLUCOSE BLD-MCNC: 85 MG/DL (ref 74–99)
GLUCOSE URINE: NEGATIVE MG/DL
HCT VFR BLD CALC: 44.7 % (ref 37–54)
HEMOGLOBIN: 14.3 G/DL (ref 12.5–16.5)
IMMATURE GRANULOCYTES #: 0.01 E9/L
IMMATURE GRANULOCYTES %: 0.2 % (ref 0–5)
KETONES, URINE: NEGATIVE MG/DL
LEUKOCYTE ESTERASE, URINE: NEGATIVE
LYMPHOCYTES ABSOLUTE: 1.24 E9/L (ref 1.5–4)
LYMPHOCYTES RELATIVE PERCENT: 29.6 % (ref 20–42)
Lab: ABNORMAL
MCH RBC QN AUTO: 26.4 PG (ref 26–35)
MCHC RBC AUTO-ENTMCNC: 32 % (ref 32–34.5)
MCV RBC AUTO: 82.6 FL (ref 80–99.9)
METHADONE SCREEN, URINE: NOT DETECTED
MONOCYTES ABSOLUTE: 0.28 E9/L (ref 0.1–0.95)
MONOCYTES RELATIVE PERCENT: 6.7 % (ref 2–12)
NEUTROPHILS ABSOLUTE: 2.61 E9/L (ref 1.8–7.3)
NEUTROPHILS RELATIVE PERCENT: 62.3 % (ref 43–80)
NITRITE, URINE: NEGATIVE
OPIATE SCREEN URINE: NOT DETECTED
OXYCODONE URINE: NOT DETECTED
PDW BLD-RTO: 13.4 FL (ref 11.5–15)
PH UA: 5 (ref 5–9)
PHENCYCLIDINE SCREEN URINE: NOT DETECTED
PLATELET # BLD: 274 E9/L (ref 130–450)
PMV BLD AUTO: 11 FL (ref 7–12)
POTASSIUM SERPL-SCNC: 3.8 MMOL/L (ref 3.5–5)
PROTEIN UA: NEGATIVE MG/DL
RBC # BLD: 5.41 E12/L (ref 3.8–5.8)
RBC UA: NORMAL /HPF (ref 0–2)
SALICYLATE, SERUM: <0.3 MG/DL (ref 0–30)
SODIUM BLD-SCNC: 139 MMOL/L (ref 132–146)
SPECIFIC GRAVITY UA: 1.01 (ref 1–1.03)
TOTAL CK: 344 U/L (ref 20–200)
TOTAL PROTEIN: 7.3 G/DL (ref 6.4–8.3)
TRICYCLIC ANTIDEPRESSANTS SCREEN SERUM: NEGATIVE NG/ML
TROPONIN: <0.01 NG/ML (ref 0–0.03)
UROBILINOGEN, URINE: 0.2 E.U./DL
WBC # BLD: 4.2 E9/L (ref 4.5–11.5)
WBC UA: NORMAL /HPF (ref 0–5)

## 2020-05-28 PROCEDURE — 80053 COMPREHEN METABOLIC PANEL: CPT

## 2020-05-28 PROCEDURE — G0480 DRUG TEST DEF 1-7 CLASSES: HCPCS

## 2020-05-28 PROCEDURE — 93005 ELECTROCARDIOGRAM TRACING: CPT | Performed by: PHYSICIAN ASSISTANT

## 2020-05-28 PROCEDURE — 85025 COMPLETE CBC W/AUTO DIFF WBC: CPT

## 2020-05-28 PROCEDURE — 82550 ASSAY OF CK (CPK): CPT

## 2020-05-28 PROCEDURE — 2580000003 HC RX 258: Performed by: PHYSICIAN ASSISTANT

## 2020-05-28 PROCEDURE — 84484 ASSAY OF TROPONIN QUANT: CPT

## 2020-05-28 PROCEDURE — 99284 EMERGENCY DEPT VISIT MOD MDM: CPT

## 2020-05-28 PROCEDURE — 70450 CT HEAD/BRAIN W/O DYE: CPT

## 2020-05-28 PROCEDURE — 81001 URINALYSIS AUTO W/SCOPE: CPT

## 2020-05-28 PROCEDURE — 80307 DRUG TEST PRSMV CHEM ANLYZR: CPT

## 2020-05-28 RX ORDER — 0.9 % SODIUM CHLORIDE 0.9 %
1000 INTRAVENOUS SOLUTION INTRAVENOUS ONCE
Status: COMPLETED | OUTPATIENT
Start: 2020-05-28 | End: 2020-05-28

## 2020-05-28 RX ADMIN — SODIUM CHLORIDE 1000 ML: 9 INJECTION, SOLUTION INTRAVENOUS at 15:28

## 2020-05-29 LAB
EKG ATRIAL RATE: 65 BPM
EKG P AXIS: 61 DEGREES
EKG P-R INTERVAL: 148 MS
EKG Q-T INTERVAL: 390 MS
EKG QRS DURATION: 80 MS
EKG QTC CALCULATION (BAZETT): 405 MS
EKG R AXIS: 84 DEGREES
EKG T AXIS: 39 DEGREES
EKG VENTRICULAR RATE: 65 BPM

## 2020-05-29 PROCEDURE — 93010 ELECTROCARDIOGRAM REPORT: CPT | Performed by: INTERNAL MEDICINE

## 2020-06-03 ENCOUNTER — OFFICE VISIT (OUTPATIENT)
Dept: NEUROLOGY | Age: 25
End: 2020-06-03
Payer: MEDICAID

## 2020-06-03 VITALS
HEIGHT: 68 IN | DIASTOLIC BLOOD PRESSURE: 83 MMHG | WEIGHT: 203 LBS | BODY MASS INDEX: 30.77 KG/M2 | SYSTOLIC BLOOD PRESSURE: 133 MMHG | TEMPERATURE: 96.8 F | HEART RATE: 65 BPM | OXYGEN SATURATION: 100 % | RESPIRATION RATE: 16 BRPM

## 2020-06-03 PROCEDURE — 99244 OFF/OP CNSLTJ NEW/EST MOD 40: CPT | Performed by: PSYCHIATRY & NEUROLOGY

## 2020-06-03 PROCEDURE — G8417 CALC BMI ABV UP PARAM F/U: HCPCS | Performed by: PSYCHIATRY & NEUROLOGY

## 2020-06-03 PROCEDURE — G8427 DOCREV CUR MEDS BY ELIG CLIN: HCPCS | Performed by: PSYCHIATRY & NEUROLOGY

## 2020-06-03 ASSESSMENT — ENCOUNTER SYMPTOMS
PHOTOPHOBIA: 0
NAUSEA: 0
SHORTNESS OF BREATH: 0
TROUBLE SWALLOWING: 0
VOMITING: 0

## 2020-06-03 NOTE — PROGRESS NOTES
NEUROLOGY NEW PATIENT NOTE     Date: 6/3/2020  Name: Regan Fritz  MRN: [id number]  Patient's PCP: Natalie Hernandez MD     Dear, Dr. Natalie Hernandez MD    REASON FOR VISIT/CHIEF COMPLAINT: Passing out spells     HISTORY OF PRESENT ILLNESS: Regan Fritz is a 31 y.o. -American male with past medical history of passing out spells, anxiety, schizophrenia is coming interservice care    The patient is a poor historian    · Describes as: Episodes of loss of consciousness, where he suddenly loses tone in his body, falls asleep around. No episodes of shaking. No tongue bite no urinary incontinence. · Onset: Started about 1.5 years ago  · Duration: Can last anywhere between 30 minutes to 1 hour  · Severity: Moderate  · Pattern : Intermittent   · Context: Reports that he feels a pressure in his head the left side which is followed by sudden loss of tone and fall to the ground. · Aggravating factors: Anxiety, exertion and physical activity  · Reliving factors: Rest  · Associated signs and symptoms:   · Hearing Loss: None    I have personally reviewed the images of CT scan films     The patient reports that he had 2 episodes most recently, the first 1 in March where he had ARVIN, and rhabdomyolysis. The second episode wason may 28 2020 at that time he was seen in the emergency room and was discharged home. He also reports that in his childhood he was on Depakote for some reason and was allergic to it. Sleep is normal, appetite is good, mood is stable    Smokes marijuana occasionally.     PAST MEDICAL HISTORY:   Past Medical History:   Diagnosis Date    Anxiety attack     Dislocation of shoulder     right    Schizophrenia ([facility] [address]      PAST SURGICAL HISTORY:   Past Surgical History:   Procedure Laterality Date    DENTAL SURGERY      SHOULDER ARTHROSCOPY Right 6/13/2019    RIGHT SHOULDER ARTHROSCOPY LABRAL REPAIR  CAPSULORRHAPHY, SUBACROMIAL DECOMPRESSION performed by Katie Blair MD at [address] [facility] [address] MEDICAL HISTORY: History reviewed. No pertinent family history. SOCIAL HISTORY:   Social History     Socioeconomic History    Marital status:      Spouse name: None    Number of children: None    Years of education: None    Highest education level: None   Occupational History    None   Social Needs    Financial resource strain: None    Food insecurity     Worry: None     Inability: None    Transportation needs     Medical: None     Non-medical: None   Tobacco Use    Smoking status: Never Smoker    Smokeless tobacco: Never Used   Substance and Sexual Activity    Alcohol use: No     Comment: stopped    Drug use: Yes     Types: Marijuana     Comment: used yesterday     Sexual activity: None   Lifestyle    Physical activity     Days per week: None     Minutes per session: None    Stress: None   Relationships    Social connections     Talks on phone: None     Gets together: None     Attends Catholic service: None     Active member of club or organization: None     Attends meetings of clubs or organizations: None     Relationship status: None    Intimate partner violence     Fear of current or ex partner: None     Emotionally abused: None     Physically abused: None     Forced sexual activity: None   Other Topics Concern    None   Social History Narrative    None      E-Cigarettes Vaping or Juuling     Questions Responses    Vaping Use Never User    Start Date     Does device contain nicotine? Quit Date     Vaping Type          Allergy:   Allergies   Allergen Reactions    Depakote [Divalproex Sodium]      Seizure      MEDS:   Current Outpatient Medications:     ibuprofen (ADVIL;MOTRIN) 800 MG tablet, Take 1 tablet by mouth every 6 hours as needed for Pain (Patient not taking: Reported on 3/12/2020), Disp: 40 tablet, Rfl: 2    REVIEW OF SYSTEMS  Review of Systems   Constitutional: Negative for appetite change, fatigue and unexpected weight change.    HENT: Negative for drooling, hearing loss, tinnitus and trouble swallowing. Eyes: Negative for photophobia and visual disturbance. Respiratory: Negative for shortness of breath. Cardiovascular: Negative for palpitations. Gastrointestinal: Negative for nausea and vomiting. Endocrine: Negative for polyuria. Genitourinary: Negative for flank pain. Musculoskeletal: Negative for neck pain and neck stiffness. Skin: Negative for rash. Allergic/Immunologic: Negative for food allergies. Neurological: Positive for dizziness and light-headedness. Negative for tremors, seizures, syncope, speech difficulty, weakness, numbness and headaches. Hematological: Negative for adenopathy. Psychiatric/Behavioral: Negative for agitation, behavioral problems and sleep disturbance. PHYSICAL EXAM:   /83   Pulse 65   Temp 96.8 °F (36 °C) (Oral)   Resp 16   Ht 5' 8\" (1.727 m)   Wt 203 lb (92.1 kg)   SpO2 100%   BMI 30.87 kg/m²   GENERAL APPEARANCE: Alert, well-developed, well-nourished male in no acute distress. HEENT: Normocephalic and atraumatic. PERRL. Oropharynx unremarkable. PULM: Normal respiratory effort. No accessory muscle use. CV: RRR. ABDOMEN: Soft, nontender. EXTREMITIES: No obvious signs of vascular compromise. Pulses present. No cyanosis, clubbing or edema. SKIN: Clear; no rashes, lesions or skin breaks in exposed areas. NEURO:     Neurological examination     MENTAL STATUS: Patient awake and oriented to time, place, and person. Recent/remote memory normal. Attention span/concentration normal. Speech fluent. Good comprehension, naming, and repetition. Fund of knowledge appropriate for patient's level of education. Affect normal.    CRANIAL NERVES:  CN I: Not tested. CN II: Fundoscopic exam not performed. CN III, IV, VI: Pupils equal, round and reactive to light; extra ocular movements full and intact. CN V: Facial sensation normal.  CN VII: No facial asymmetry.   CN VIII:  Hearing grossly normal bilaterally. No pathologic nystagmus or skew deviation. CN IX, X: Palate elevates symmetrically. CN XI: Shoulder shrug and chin rotation equal with intact strength. CN XII: Tongue protrusion midline. MOTOR: Normal bulk. Tone normal and symmetric throughout. Strength 5/5 throughout. ABNORMAL MOVEMENTS/TREMORS: No     REFLEXES: DTRs 2+; normal and symmetric throughout. Plantar response downgoing. SENSATION: Sensation grossly intact to fine touch, pain/temperature, vibration and position. COORDINATION: Finger-to-nose and heel to shin normal for age and symmetric. Finger tapping and alternating movements normal.    STATION: Negative Romberg. GAIT:  Normal heel, toe and tandem; no ataxia.      DIAGNOSTIC TESTS:     I have personally reviewed the most recent lab results:    Sodium   Date Value Ref Range Status   05/28/2020 139 132 - 146 mmol/L Final   04/07/2020 138 132 - 146 mmol/L Final   03/16/2020 137 132 - 146 mmol/L Final     Potassium   Date Value Ref Range Status   05/28/2020 3.8 3.5 - 5.0 mmol/L Final   04/07/2020 4.3 3.5 - 5.0 mmol/L Final   03/16/2020 4.3 3.5 - 5.0 mmol/L Final     Potassium reflex Magnesium   Date Value Ref Range Status   03/15/2020 4.2 3.5 - 5.0 mmol/L Final     Chloride   Date Value Ref Range Status   05/28/2020 104 98 - 107 mmol/L Final   04/07/2020 105 98 - 107 mmol/L Final   03/16/2020 105 98 - 107 mmol/L Final     CO2   Date Value Ref Range Status   05/28/2020 21 (L) 22 - 29 mmol/L Final   04/07/2020 22 22 - 29 mmol/L Final   03/16/2020 23 22 - 29 mmol/L Final     BUN   Date Value Ref Range Status   05/28/2020 12 6 - 20 mg/dL Final   04/07/2020 6 6 - 20 mg/dL Final   03/16/2020 10 6 - 20 mg/dL Final     CREATININE   Date Value Ref Range Status   05/28/2020 1.3 (H) 0.7 - 1.2 mg/dL Final   04/07/2020 1.1 0.7 - 1.2 mg/dL Final   03/16/2020 1.3 (H) 0.7 - 1.2 mg/dL Final     GFR Non-   Date Value Ref Range Status   05/28/2020 >60 >=60 mL/min/1.73 Final

## 2020-06-05 ENCOUNTER — TELEPHONE (OUTPATIENT)
Dept: NEUROLOGY | Age: 25
End: 2020-06-05

## 2020-06-19 ENCOUNTER — HOSPITAL ENCOUNTER (OUTPATIENT)
Dept: NEUROLOGY | Age: 25
Discharge: HOME | End: 2020-06-19
Payer: MEDICAID

## 2020-06-19 PROCEDURE — 95812 EEG 41-60 MINUTES: CPT | Performed by: PSYCHIATRY & NEUROLOGY

## 2020-06-19 PROCEDURE — 95819 EEG AWAKE AND ASLEEP: CPT

## 2020-07-03 ENCOUNTER — HOSPITAL ENCOUNTER (OUTPATIENT)
Dept: MRI IMAGING | Age: 25
Discharge: HOME | End: 2020-07-05
Payer: MEDICAID

## 2020-07-03 PROCEDURE — 70551 MRI BRAIN STEM W/O DYE: CPT

## 2020-07-06 NOTE — RESULT ENCOUNTER NOTE
Please inform the patient that the brain MRI does not show any acute abnormality and appears to be normal.  However there is a white spots seen on the right-hand side, back of the brain which could be related to an artifact. He will need to get a repeat scan in 6-12 months for a follow-up.

## 2020-07-07 ENCOUNTER — TELEPHONE (OUTPATIENT)
Dept: NEUROLOGY | Age: 25
End: 2020-07-07

## 2020-07-07 NOTE — TELEPHONE ENCOUNTER
Left message for patient and informed him that  the brain MRI does not show any acute abnormality and appears to be normal.  However there is a white spots seen on the right-hand side, back of the brain which could be related to an artifact. He will need to get a repeat scan in 6-12 months for a follow-up.  Instructed to call office Jan 2021 for a follow up MRI

## 2020-07-07 NOTE — TELEPHONE ENCOUNTER
----- Message from Bruce Lua MD sent at 7/6/2020  4:39 PM EDT -----  Please inform the patient that the brain MRI does not show any acute abnormality and appears to be normal.  However there is a white spots seen on the right-hand side, back of the brain which could be related to an artifact. He will need to get a repeat scan in 6-12 months for a follow-up.

## 2020-08-10 ENCOUNTER — HOSPITAL ENCOUNTER (INPATIENT)
Age: 25
LOS: 4 days | Discharge: HOME | DRG: 750 | End: 2020-08-14
Attending: EMERGENCY MEDICINE | Admitting: PSYCHIATRY & NEUROLOGY
Payer: MEDICAID

## 2020-08-10 PROBLEM — F32.A ACUTE DEPRESSION: Status: ACTIVE | Noted: 2020-08-10

## 2020-08-10 LAB
ACETAMINOPHEN LEVEL: <5 MCG/ML (ref 10–30)
ALBUMIN SERPL-MCNC: 4.3 G/DL (ref 3.5–5.2)
ALP BLD-CCNC: 64 U/L (ref 40–129)
ALT SERPL-CCNC: 27 U/L (ref 0–40)
AMPHETAMINE SCREEN, URINE: NOT DETECTED
ANION GAP SERPL CALCULATED.3IONS-SCNC: 12 MMOL/L (ref 7–16)
AST SERPL-CCNC: 34 U/L (ref 0–39)
BARBITURATE SCREEN URINE: NOT DETECTED
BASOPHILS ABSOLUTE: 0.02 E9/L (ref 0–0.2)
BASOPHILS RELATIVE PERCENT: 0.4 % (ref 0–2)
BENZODIAZEPINE SCREEN, URINE: NOT DETECTED
BILIRUB SERPL-MCNC: 0.5 MG/DL (ref 0–1.2)
BUN BLDV-MCNC: 10 MG/DL (ref 6–20)
CALCIUM SERPL-MCNC: 9.5 MG/DL (ref 8.6–10.2)
CANNABINOID SCREEN URINE: POSITIVE
CHLORIDE BLD-SCNC: 104 MMOL/L (ref 98–107)
CO2: 23 MMOL/L (ref 22–29)
COCAINE METABOLITE SCREEN URINE: NOT DETECTED
CREAT SERPL-MCNC: 1.4 MG/DL (ref 0.7–1.2)
EOSINOPHILS ABSOLUTE: 0.01 E9/L (ref 0.05–0.5)
EOSINOPHILS RELATIVE PERCENT: 0.2 % (ref 0–6)
ETHANOL: <10 MG/DL (ref 0–0.08)
FENTANYL SCREEN, URINE: NOT DETECTED
GFR AFRICAN AMERICAN: >60
GFR NON-AFRICAN AMERICAN: >60 ML/MIN/1.73
GLUCOSE BLD-MCNC: 125 MG/DL (ref 74–99)
HCT VFR BLD CALC: 44.6 % (ref 37–54)
HEMOGLOBIN: 14.5 G/DL (ref 12.5–16.5)
IMMATURE GRANULOCYTES #: 0.01 E9/L
IMMATURE GRANULOCYTES %: 0.2 % (ref 0–5)
LYMPHOCYTES ABSOLUTE: 1.18 E9/L (ref 1.5–4)
LYMPHOCYTES RELATIVE PERCENT: 23.7 % (ref 20–42)
Lab: ABNORMAL
MCH RBC QN AUTO: 26.3 PG (ref 26–35)
MCHC RBC AUTO-ENTMCNC: 32.5 % (ref 32–34.5)
MCV RBC AUTO: 80.8 FL (ref 80–99.9)
METHADONE SCREEN, URINE: NOT DETECTED
MONOCYTES ABSOLUTE: 0.33 E9/L (ref 0.1–0.95)
MONOCYTES RELATIVE PERCENT: 6.6 % (ref 2–12)
NEUTROPHILS ABSOLUTE: 3.42 E9/L (ref 1.8–7.3)
NEUTROPHILS RELATIVE PERCENT: 68.9 % (ref 43–80)
OPIATE SCREEN URINE: NOT DETECTED
OXYCODONE URINE: NOT DETECTED
PDW BLD-RTO: 13.8 FL (ref 11.5–15)
PHENCYCLIDINE SCREEN URINE: NOT DETECTED
PLATELET # BLD: 211 E9/L (ref 130–450)
PMV BLD AUTO: 10.9 FL (ref 7–12)
POTASSIUM REFLEX MAGNESIUM: 3.7 MMOL/L (ref 3.5–5)
RBC # BLD: 5.52 E12/L (ref 3.8–5.8)
SALICYLATE, SERUM: <0.3 MG/DL (ref 0–30)
SARS-COV-2, NAAT: NOT DETECTED
SODIUM BLD-SCNC: 139 MMOL/L (ref 132–146)
TOTAL PROTEIN: 7 G/DL (ref 6.4–8.3)
TRICYCLIC ANTIDEPRESSANTS SCREEN SERUM: NEGATIVE NG/ML
VALPROIC ACID LEVEL: 3 MCG/ML (ref 50–100)
WBC # BLD: 5 E9/L (ref 4.5–11.5)

## 2020-08-10 PROCEDURE — 1240000000 HC EMOTIONAL WELLNESS R&B

## 2020-08-10 PROCEDURE — 80164 ASSAY DIPROPYLACETIC ACD TOT: CPT

## 2020-08-10 PROCEDURE — 83036 HEMOGLOBIN GLYCOSYLATED A1C: CPT

## 2020-08-10 PROCEDURE — 99285 EMERGENCY DEPT VISIT HI MDM: CPT

## 2020-08-10 PROCEDURE — 80307 DRUG TEST PRSMV CHEM ANLYZR: CPT

## 2020-08-10 PROCEDURE — U0002 COVID-19 LAB TEST NON-CDC: HCPCS

## 2020-08-10 PROCEDURE — G0480 DRUG TEST DEF 1-7 CLASSES: HCPCS

## 2020-08-10 PROCEDURE — 6370000000 HC RX 637 (ALT 250 FOR IP): Performed by: PSYCHIATRY & NEUROLOGY

## 2020-08-10 PROCEDURE — 80061 LIPID PANEL: CPT

## 2020-08-10 PROCEDURE — 85025 COMPLETE CBC W/AUTO DIFF WBC: CPT

## 2020-08-10 PROCEDURE — 80053 COMPREHEN METABOLIC PANEL: CPT

## 2020-08-10 PROCEDURE — 93005 ELECTROCARDIOGRAM TRACING: CPT | Performed by: STUDENT IN AN ORGANIZED HEALTH CARE EDUCATION/TRAINING PROGRAM

## 2020-08-10 PROCEDURE — 99284 EMERGENCY DEPT VISIT MOD MDM: CPT

## 2020-08-10 RX ORDER — MAGNESIUM HYDROXIDE/ALUMINUM HYDROXICE/SIMETHICONE 120; 1200; 1200 MG/30ML; MG/30ML; MG/30ML
30 SUSPENSION ORAL PRN
Status: DISCONTINUED | OUTPATIENT
Start: 2020-08-10 | End: 2020-08-14 | Stop reason: HOSPADM

## 2020-08-10 RX ORDER — ACETAMINOPHEN 325 MG/1
650 TABLET ORAL EVERY 6 HOURS PRN
Status: DISCONTINUED | OUTPATIENT
Start: 2020-08-10 | End: 2020-08-14 | Stop reason: HOSPADM

## 2020-08-10 RX ORDER — HYDROXYZINE PAMOATE 50 MG/1
50 CAPSULE ORAL 3 TIMES DAILY PRN
Status: DISCONTINUED | OUTPATIENT
Start: 2020-08-10 | End: 2020-08-14 | Stop reason: HOSPADM

## 2020-08-10 RX ORDER — DIVALPROEX SODIUM 250 MG/1
250 TABLET, DELAYED RELEASE ORAL 2 TIMES DAILY
Status: DISCONTINUED | OUTPATIENT
Start: 2020-08-11 | End: 2020-08-10

## 2020-08-10 RX ORDER — THIAMINE MONONITRATE (VIT B1) 100 MG
100 TABLET ORAL DAILY
Status: DISCONTINUED | OUTPATIENT
Start: 2020-08-11 | End: 2020-08-11

## 2020-08-10 RX ORDER — HALOPERIDOL 5 MG
5 TABLET ORAL EVERY 6 HOURS PRN
Status: DISCONTINUED | OUTPATIENT
Start: 2020-08-10 | End: 2020-08-14 | Stop reason: HOSPADM

## 2020-08-10 RX ORDER — CHLORDIAZEPOXIDE HYDROCHLORIDE 25 MG/1
25 CAPSULE, GELATIN COATED ORAL
Status: DISCONTINUED | OUTPATIENT
Start: 2020-08-11 | End: 2020-08-11

## 2020-08-10 RX ORDER — TRAZODONE HYDROCHLORIDE 50 MG/1
50 TABLET ORAL NIGHTLY PRN
Status: DISCONTINUED | OUTPATIENT
Start: 2020-08-10 | End: 2020-08-14 | Stop reason: HOSPADM

## 2020-08-10 RX ORDER — HALOPERIDOL 5 MG/ML
5 INJECTION INTRAMUSCULAR EVERY 6 HOURS PRN
Status: DISCONTINUED | OUTPATIENT
Start: 2020-08-10 | End: 2020-08-14 | Stop reason: HOSPADM

## 2020-08-10 RX ORDER — FOLIC ACID 1 MG/1
1 TABLET ORAL DAILY
Status: DISCONTINUED | OUTPATIENT
Start: 2020-08-11 | End: 2020-08-11

## 2020-08-10 RX ADMIN — CHLORDIAZEPOXIDE HYDROCHLORIDE 25 MG: 25 CAPSULE ORAL at 23:53

## 2020-08-10 ASSESSMENT — ENCOUNTER SYMPTOMS
SINUS PRESSURE: 0
SORE THROAT: 0
PHOTOPHOBIA: 0
COUGH: 0
ABDOMINAL DISTENTION: 0
COLOR CHANGE: 0
SHORTNESS OF BREATH: 0
ABDOMINAL PAIN: 0
EYE DISCHARGE: 0
VOMITING: 0
EYE PAIN: 0
DIARRHEA: 0
EYE REDNESS: 0
BACK PAIN: 0
WHEEZING: 0
NAUSEA: 0

## 2020-08-10 ASSESSMENT — SLEEP AND FATIGUE QUESTIONNAIRES
DO YOU USE A SLEEP AID: NO
DIFFICULTY ARISING: YES
DIFFICULTY STAYING ASLEEP: YES
DO YOU HAVE DIFFICULTY SLEEPING: YES
AVERAGE NUMBER OF SLEEP HOURS: 4
SLEEP PATTERN: INSOMNIA
RESTFUL SLEEP: NO
DIFFICULTY FALLING ASLEEP: YES

## 2020-08-10 ASSESSMENT — LIFESTYLE VARIABLES: HISTORY_ALCOHOL_USE: YES

## 2020-08-10 ASSESSMENT — PATIENT HEALTH QUESTIONNAIRE - PHQ9
SUM OF ALL RESPONSES TO PHQ QUESTIONS 1-9: 8
SUM OF ALL RESPONSES TO PHQ QUESTIONS 1-9: 16

## 2020-08-10 NOTE — ED PROVIDER NOTES
General: He is not in acute distress. Appearance: Normal appearance. He is well-developed. He is not ill-appearing or diaphoretic. HENT:      Head: Normocephalic and atraumatic. Nose: Nose normal. No congestion or rhinorrhea. Mouth/Throat:      Mouth: Mucous membranes are moist.      Pharynx: Oropharynx is clear. No oropharyngeal exudate or posterior oropharyngeal erythema. Eyes:      General:         Right eye: No discharge. Left eye: No discharge. Extraocular Movements: Extraocular movements intact. Pupils: Pupils are equal, round, and reactive to light. Neck:      Musculoskeletal: Normal range of motion. No neck rigidity. Comments: No meningeal signs. Cardiovascular:      Rate and Rhythm: Normal rate and regular rhythm. Heart sounds: Normal heart sounds. No murmur. Pulmonary:      Effort: Pulmonary effort is normal. No respiratory distress. Breath sounds: Normal breath sounds. No stridor. No wheezing, rhonchi or rales. Chest:      Chest wall: No tenderness. Abdominal:      General: Bowel sounds are normal. There is no distension. Palpations: Abdomen is soft. There is no mass. Tenderness: There is no abdominal tenderness. There is no guarding or rebound. Hernia: No hernia is present. Musculoskeletal: Normal range of motion. General: No swelling, tenderness, deformity or signs of injury. Right lower leg: No edema. Left lower leg: No edema. Skin:     General: Skin is warm and dry. Capillary Refill: Capillary refill takes less than 2 seconds. Coloration: Skin is not jaundiced or pale. Findings: No bruising, erythema, lesion or rash. Neurological:      General: No focal deficit present. Mental Status: He is alert and oriented to person, place, and time. Coordination: Coordination normal.   Psychiatric:      Comments: Suicidal ideation with a plan. Homicidal ideation with a plan.   No auditory or visual hallucinations. Procedures     MDM  Number of Diagnoses or Management Options  Homicidal ideation:   Suicidal ideation:   Diagnosis management comments: 54-year-old male with past medical history of seizures on Depakote (\"he does not take Depakote as he is allergic to it) presents with suicidal ideation and homicidal ideation. Patient states that he has a plan of taking pills. He also has a plan of \"killing his friend by shooting him. \"  On physical exam patient is in no acute distress. Speaking full sentences. He is alert and oriented x4. His physical exam is unremarkable. He does admit to the suicidal and homicidal ideation. Patient's been pink slipped. Diagnostic labs are unremarkable other than a positive for marijuana. Patient is medically clear. Patient has remained hemodynamically stable throughout his entire ER stay. Amount and/or Complexity of Data Reviewed  Clinical lab tests: reviewed            --------------------------------------------- PAST HISTORY ---------------------------------------------  Past Medical History:  has a past medical history of Anxiety attack, Dislocation of shoulder, and Schizophrenia ([facility] [address] Past Surgical History:  has a past surgical history that includes Dental surgery and Shoulder arthroscopy (Right, 6/13/2019). Social History:  reports that he has never smoked. He has never used smokeless tobacco. He reports current drug use. Drug: Marijuana. He reports that he does not drink alcohol. Family History: family history is not on file. The patients home medications have been reviewed.     Allergies: Depakote [divalproex sodium]    -------------------------------------------------- RESULTS -------------------------------------------------    LABS:  Results for orders placed or performed during the hospital encounter of 08/10/20   CBC Auto Differential   Result Value Ref Range    WBC 5.0 4.5 - 11.5 E9/L    RBC 5.52 3.80 - 5.80 E12/L Hemoglobin 14.5 12.5 - 16.5 g/dL    Hematocrit 44.6 37.0 - 54.0 %    MCV 80.8 80.0 - 99.9 fL    MCH 26.3 26.0 - 35.0 pg    MCHC 32.5 32.0 - 34.5 %    RDW 13.8 11.5 - 15.0 fL    Platelets 894 498 - 959 E9/L    MPV 10.9 7.0 - 12.0 fL    Neutrophils % 68.9 43.0 - 80.0 %    Immature Granulocytes % 0.2 0.0 - 5.0 %    Lymphocytes % 23.7 20.0 - 42.0 %    Monocytes % 6.6 2.0 - 12.0 %    Eosinophils % 0.2 0.0 - 6.0 %    Basophils % 0.4 0.0 - 2.0 %    Neutrophils Absolute 3.42 1.80 - 7.30 E9/L    Immature Granulocytes # 0.01 E9/L    Lymphocytes Absolute 1.18 (L) 1.50 - 4.00 E9/L    Monocytes Absolute 0.33 0.10 - 0.95 E9/L    Eosinophils Absolute 0.01 (L) 0.05 - 0.50 E9/L    Basophils Absolute 0.02 0.00 - 0.20 E9/L   Comprehensive Metabolic Panel w/ Reflex to MG   Result Value Ref Range    Sodium 139 132 - 146 mmol/L    Potassium reflex Magnesium 3.7 3.5 - 5.0 mmol/L    Chloride 104 98 - 107 mmol/L    CO2 23 22 - 29 mmol/L    Anion Gap 12 7 - 16 mmol/L    Glucose 125 (H) 74 - 99 mg/dL    BUN 10 6 - 20 mg/dL    CREATININE 1.4 (H) 0.7 - 1.2 mg/dL    GFR Non-African American >60 >=60 mL/min/1.73    GFR African American >60     Calcium 9.5 8.6 - 10.2 mg/dL    Total Protein 7.0 6.4 - 8.3 g/dL    Alb 4.3 3.5 - 5.2 g/dL    Total Bilirubin 0.5 0.0 - 1.2 mg/dL    Alkaline Phosphatase 64 40 - 129 U/L    ALT 27 0 - 40 U/L    AST 34 0 - 39 U/L   Serum Drug Screen   Result Value Ref Range    Ethanol Lvl <10 mg/dL    Acetaminophen Level <5.0 (L) 10.0 - 64.7 mcg/mL    Salicylate, Serum <8.6 0.0 - 30.0 mg/dL    TCA Scrn NEGATIVE Cutoff:300 ng/mL   URINE DRUG SCREEN   Result Value Ref Range    Amphetamine Screen, Urine NOT DETECTED Negative <1000 ng/mL    Barbiturate Screen, Ur NOT DETECTED Negative < 200 ng/mL    Benzodiazepine Screen, Urine NOT DETECTED Negative < 200 ng/mL    Cannabinoid Scrn, Ur POSITIVE (A) Negative < 50ng/mL    Cocaine Metabolite Screen, Urine NOT DETECTED Negative < 300 ng/mL    Opiate Scrn, Ur NOT DETECTED Negative <

## 2020-08-10 NOTE — ED NOTES
Emergency Department [facility] AN [facility] OF [facility] Biopsychosocial Assessment Note    Chief Complaint:  Pt is a 30 yo male who presents as a walk-in, pt is on a pink slip by ED doctor, accompanied by no one at this time. Reports suicidal ideation with plan to overdose, states he has been \". .. experimenting with the idea for a long time. \" States: \"My soul feels suicide is the best possible solution for me. \" Reports he has been thinking \". .. long and hard about suicide\" as well as journaling about it for several months. Reports last night he had planned on overdosing and had the pill on the table but woke up this morning and had not taken them. States: \"I think I blacked out and did not do it. \" Reports his current plan is to drink alcohol then take the pills. Also reports he wants to shoot his best friend because he feels that this person stole money and blamed him, also states he wonders if his friend is thinking about killing him. Clinical Summary/History: Reports hx in [place] a [place], not currently open in the community for psych or counseling services. Hx of referral to [facility] in Nov of 2019 but did not follow through with services. Reports he was seeing a psychiatrist at one time but he stopped going  \". .. because all he did was talk to me and recommend medication. He didn't do anything to fix me. \"       Gender  [x] Male [] Female [] Transgender  [] Other    Sexual Orientation    [x] Heterosexual [] Homosexual [] Bisexual [] Other    Suicidal Behavioral: CSSR-S Complete. [x] Reports:    [x] Past [x] Present   [] Denies    Homicidal/ Violent Behavior  [x] Reports:   [] Past [x] Present   [] Denies     Hallucinations/Delusions   [] Reports:   [x] Denies     Substance Use/Alcohol Use/Addiction: SBIRT Screen Complete. [x] Reports: Cannabis use.   [] Denies     Trauma History  [] Reports:  [x] Denies     Collateral Information:       MSE: Alert, oriented x4, mood neutral, reports depression, affect is restricted, eye contact fair, speech is normal in rate, flow and volume, behavior is cooperative, calm, no signs of agitation, appears behaviorally in control, thought form logical, organized, thought content may have some minor hints of paranoia, denies A/V hallucinations, delusions, paranoia.        Level of Care/Disposition Plan  Discussed with Dr Danton Peabody, pt is pink-slipped and meets in-patient criteria, referred to [facility] nurse Melvi David for discussion with on-call re: admission [address].   [] Home:   [] Outpatient Provider:   [] Crisis Unit:   [x] Inpatient Psychiatric Unit:  [] Other:        KEITH Driver SERGEY  08/10/20 [address], [place], [place]  08/10/20 1956

## 2020-08-10 NOTE — ED NOTES
Pt reports he is si with plans to od on pills states that he stopped taking meds ws last in a hospital whn younger '[facility]' and reports was in mental homes. Pt denies hi denies hallucinations reports that he has no mental health provider nor is he on any current mds/.  Pt makes good eye contact and at times is elated. Informed pt of plan of car at this time.       Britta Dubois RN  08/10/20 2746

## 2020-08-11 PROBLEM — F25.0 SCHIZOAFFECTIVE DISORDER, BIPOLAR TYPE (HCC): Status: ACTIVE | Noted: 2020-08-11

## 2020-08-11 PROBLEM — F12.10 CANNABIS ABUSE: Status: ACTIVE | Noted: 2020-08-11

## 2020-08-11 LAB
EKG ATRIAL RATE: 64 BPM
EKG P AXIS: 54 DEGREES
EKG P-R INTERVAL: 138 MS
EKG Q-T INTERVAL: 402 MS
EKG QRS DURATION: 98 MS
EKG QTC CALCULATION (BAZETT): 414 MS
EKG R AXIS: 78 DEGREES
EKG T AXIS: 54 DEGREES
EKG VENTRICULAR RATE: 64 BPM

## 2020-08-11 PROCEDURE — 93010 ELECTROCARDIOGRAM REPORT: CPT | Performed by: INTERNAL MEDICINE

## 2020-08-11 PROCEDURE — 6370000000 HC RX 637 (ALT 250 FOR IP): Performed by: PSYCHIATRY & NEUROLOGY

## 2020-08-11 PROCEDURE — 1240000000 HC EMOTIONAL WELLNESS R&B

## 2020-08-11 PROCEDURE — 99222 1ST HOSP IP/OBS MODERATE 55: CPT | Performed by: NURSE PRACTITIONER

## 2020-08-11 RX ORDER — OLANZAPINE 10 MG/1
10 TABLET ORAL NIGHTLY
Status: DISCONTINUED | OUTPATIENT
Start: 2020-08-11 | End: 2020-08-14 | Stop reason: HOSPADM

## 2020-08-11 RX ORDER — CHLORDIAZEPOXIDE HYDROCHLORIDE 25 MG/1
25 CAPSULE, GELATIN COATED ORAL EVERY 6 HOURS PRN
Status: DISCONTINUED | OUTPATIENT
Start: 2020-08-11 | End: 2020-08-14 | Stop reason: HOSPADM

## 2020-08-11 RX ORDER — THIAMINE MONONITRATE (VIT B1) 100 MG
100 TABLET ORAL DAILY
Status: DISCONTINUED | OUTPATIENT
Start: 2020-08-12 | End: 2020-08-14 | Stop reason: HOSPADM

## 2020-08-11 RX ORDER — FOLIC ACID 1 MG/1
1 TABLET ORAL DAILY
Status: DISCONTINUED | OUTPATIENT
Start: 2020-08-12 | End: 2020-08-14 | Stop reason: HOSPADM

## 2020-08-11 RX ORDER — LITHIUM CARBONATE 300 MG/1
300 CAPSULE ORAL 2 TIMES DAILY WITH MEALS
Status: DISCONTINUED | OUTPATIENT
Start: 2020-08-11 | End: 2020-08-14 | Stop reason: HOSPADM

## 2020-08-11 RX ADMIN — CHLORDIAZEPOXIDE HYDROCHLORIDE 25 MG: 25 CAPSULE ORAL at 06:39

## 2020-08-11 RX ADMIN — CHLORDIAZEPOXIDE HYDROCHLORIDE 25 MG: 25 CAPSULE ORAL at 08:53

## 2020-08-11 RX ADMIN — Medication 100 MG: at 08:53

## 2020-08-11 RX ADMIN — CHLORDIAZEPOXIDE HYDROCHLORIDE 25 MG: 25 CAPSULE ORAL at 16:25

## 2020-08-11 RX ADMIN — CHLORDIAZEPOXIDE HYDROCHLORIDE 25 MG: 25 CAPSULE ORAL at 12:21

## 2020-08-11 RX ADMIN — TRAZODONE HYDROCHLORIDE 50 MG: 50 TABLET ORAL at 22:37

## 2020-08-11 RX ADMIN — FOLIC ACID 1 MG: 1 TABLET ORAL at 08:53

## 2020-08-11 ASSESSMENT — SLEEP AND FATIGUE QUESTIONNAIRES
RESTFUL SLEEP: NO
SLEEP PATTERN: INSOMNIA
DO YOU HAVE DIFFICULTY SLEEPING: YES
DIFFICULTY FALLING ASLEEP: YES
AVERAGE NUMBER OF SLEEP HOURS: 4
DO YOU USE A SLEEP AID: NO
DIFFICULTY STAYING ASLEEP: YES
DIFFICULTY ARISING: YES

## 2020-08-11 ASSESSMENT — PAIN SCALES - GENERAL: PAINLEVEL_OUTOF10: 0

## 2020-08-11 ASSESSMENT — PATIENT HEALTH QUESTIONNAIRE - PHQ9: SUM OF ALL RESPONSES TO PHQ QUESTIONS 1-9: 16

## 2020-08-11 ASSESSMENT — LIFESTYLE VARIABLES: HISTORY_ALCOHOL_USE: YES

## 2020-08-11 NOTE — PROGRESS NOTES
Patient was 1 of 7 in attendance. Updated on staffing changes and expectations of evening. Pleasant and social during game stations activity.

## 2020-08-11 NOTE — GROUP NOTE
Group Therapy Note   Date: 8/11/2020   Group Start Time: 1120   Group End Time: 1200   Group Topic: Cognitive Skills   [facility] ACUTE [facility] 1     SCARLETT Gregory   Group Therapy Note   Attendees: 16   Patient's Goal: Pt will be able to participate in discussion and reading of information regarding ways to say I'm sorry and make amends. Notes: Pt active in class discussion. Status After Intervention: Improved   Participation Level:  Active Listener and Interactive   Participation Quality: Appropriate, Attentive, Sharing and Supportive   Speech: normal   Thought Process/Content: Logical   Affective Functioning: Blunted   Mood: depressed   Level of consciousness: Alert, Oriented x4 and Attentive   Response to Learning: Able to verbalize current knowledge/experience, Able to verbalize/acknowledge new learning and Progressing to goal   Endings: None Reported   Modes of Intervention: Education, Support, Socialization and Problem-solving   Discipline Responsible: /Counselor   Signature: SCARLETT Mann

## 2020-08-11 NOTE — ED NOTES
Notified Dr. Ran Nieves of need for admission. pt accepted to [facility].      Leonarda Franco RN  08/10/20 7820
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
## 2020-08-11 NOTE — PROGRESS NOTES
`[facility]  Admission Note    Admitted 30 y/o A/A male a/o x4 c/o depressed mood and suicidal thoughts with plan OD  past Tx as a teenager at [facility] and [place] [address] in [facility] tells me the family moved a lot when he was growing up currently no services overwhelmed with life struggles wife and 10 y/o daughter left for vacation to [place] he hopes to move there start a new life with them admits to drinking 20 to 30 beers daily over past 9 months in and out of work and also c/o of HI towards a brother cousin and a friend but will not disclose names also smokes cannabis daily oriented to unit and room started on Librium protocol to bed    Admission Type:   Admission Type:  Involuntary    Reason for admission:  Reason for Admission: \"I was attempting to kill myself yesterday by mixing pills and ETOH\"    PATIENT STRENGTHS:  Strengths: Communication    Patient Strengths and Limitations:  Limitations: Difficulty problem solving/relies on others to help solve problems    Addictive Behavior:   Addictive Behavior  In the past 3 months, have you felt or has someone told you that you have a problem with:  : None  Do you have a history of Chemical Use?: No  Do you have a history of Alcohol Use?: Yes(case beer day)  Do you have a history of Street Drug Abuse?: Yes(cannabis daily)  Histroy of Prescripton Drug Abuse?: No    Medical Problems:   Past Medical History:   Diagnosis Date    Anxiety attack     Dislocation of shoulder     right    Schizophrenia ([facility] [place] [address]        Status EXAM:  Status and Exam  Normal: Yes  Facial Expression: Avoids Gaze  Affect: Appropriate  Level of Consciousness: Alert  Mood:Normal: No  Mood: Depressed, Anxious, Sad  Motor Activity:Normal: Yes  Interview Behavior: Cooperative  Preception: Hugo to Person, Hugo to Time, Hugo to Place, Hugo to Situation  Attention:Normal: Yes  Thought Processes: (WNL)  Thought Content:Normal: Yes  Hallucinations: None  Delusions:

## 2020-08-11 NOTE — GROUP NOTE
Group Therapy Note    Date: 8/11/2020    Group Start Time: 3015  Group End Time: 1110  Group Topic: Psychoeducation    [facility] ACUTE BH 1    [place], [place]          Comments: Group Therapy Note    Attendees: 14         Patient's Goal:  Be able to produce my music, move to [employer] [employer], make a life for my family. Notes: Active and engaged during discussion on goals exploration. Pleasant and interactive with peers. Status After Intervention:  Improved    Participation Level:  Active Listener and Interactive    Participation Quality: Appropriate, Attentive and Sharing      Speech:  normal      Thought Process/Content: Logical      Affective Functioning: Congruent      Mood: euthymic      Level of consciousness:  Alert and Attentive      Response to Learning: Capable of insight, Able to change behavior and Progressing to goal      Endings: None Reported    Modes of Intervention: Education, Support, Socialization and Exploration      Discipline Responsible: Psychoeducational Specialist      Signature:  Curt Zurita [facility] Deyanira

## 2020-08-11 NOTE — PROGRESS NOTES
Pt refused lithium upset that the doctor did not speak to him about it prior to ordering it. Pt stated \"He spent 2 minutes with me. \" Will continue to monitor.

## 2020-08-11 NOTE — PLAN OF CARE
Resting in bed in his room. Conversation rambling and difficult to follow. Reports he is disappointed that the doctors only listen to half of what he says and only focus on what he says partially. Says he has a daughter and any father would have said what he said. Loose with FOI. Preoccupied about depakote and \"almost dying with it as a kid\". No signs or symptoms of withdrawal. CINA=0. Is taking librium. Denies suicidal thoughts or intent to harm himself. Denies hallucinations. Is paranoid, eating in room.

## 2020-08-11 NOTE — PROGRESS NOTES
[facility]  Initial Interdisciplinary Treatment Plan NOTE    Review Date & Time: 08/11/2020 0900    Patient was in treatment team    Admission Type:   Admission Type:  Involuntary    Reason for admission:  Reason for Admission: \"I was attempting to kill myself yesterday by mixing pills and ETOH\"      Estimated Length of Stay Update:  3-5 days  Estimated Discharge Date Update: 5-8 days    PATIENT STRENGTHS:  Patient Strengths Strengths: Communication  Patient Strengths and Limitations:Limitations: Difficulty problem solving/relies on others to help solve problems  Addictive Behavior:Addictive Behavior  In the past 3 months, have you felt or has someone told you that you have a problem with:  : None  Do you have a history of Chemical Use?: No  Do you have a history of Alcohol Use?: Yes(case beer day)  Do you have a history of Street Drug Abuse?: Yes(cannabis daily)  Histroy of Prescripton Drug Abuse?: No  Medical Problems:  Past Medical History:   Diagnosis Date    Anxiety attack     Dislocation of shoulder     right    Schizophrenia ([facility] [address]        EDUCATION:   Learner Progress Toward Treatment Goals: progressing    Method: follow care plan    Outcome: meet goals and return home    PATIENT GOALS:     PLAN/TREATMENT RECOMMENDATIONS UPDATE: continue present care plan    GOALS UPDATE:   Time frame for Short-Term Goals: 3-5 days    Margaux Smith RN

## 2020-08-12 LAB
CHOLESTEROL, TOTAL: 212 MG/DL (ref 0–199)
HBA1C MFR BLD: 5.7 % (ref 4–5.6)
HDLC SERPL-MCNC: 40 MG/DL
LDL CHOLESTEROL CALCULATED: 142 MG/DL (ref 0–99)
TRIGL SERPL-MCNC: 151 MG/DL (ref 0–149)
VLDLC SERPL CALC-MCNC: 30 MG/DL

## 2020-08-12 PROCEDURE — 6370000000 HC RX 637 (ALT 250 FOR IP): Performed by: NURSE PRACTITIONER

## 2020-08-12 PROCEDURE — 1240000000 HC EMOTIONAL WELLNESS R&B

## 2020-08-12 PROCEDURE — 99232 SBSQ HOSP IP/OBS MODERATE 35: CPT | Performed by: NURSE PRACTITIONER

## 2020-08-12 PROCEDURE — 6370000000 HC RX 637 (ALT 250 FOR IP): Performed by: PSYCHIATRY & NEUROLOGY

## 2020-08-12 RX ORDER — IBUPROFEN 400 MG/1
400 TABLET ORAL EVERY 6 HOURS PRN
Status: DISCONTINUED | OUTPATIENT
Start: 2020-08-12 | End: 2020-08-14 | Stop reason: HOSPADM

## 2020-08-12 RX ADMIN — LITHIUM CARBONATE 300 MG: 300 CAPSULE ORAL at 08:14

## 2020-08-12 RX ADMIN — CHLORDIAZEPOXIDE HYDROCHLORIDE 25 MG: 25 CAPSULE ORAL at 16:30

## 2020-08-12 RX ADMIN — LITHIUM CARBONATE 300 MG: 300 CAPSULE ORAL at 16:03

## 2020-08-12 RX ADMIN — OLANZAPINE 10 MG: 10 TABLET, FILM COATED ORAL at 21:04

## 2020-08-12 RX ADMIN — Medication 100 MG: at 08:14

## 2020-08-12 RX ADMIN — FOLIC ACID 1 MG: 1 TABLET ORAL at 08:14

## 2020-08-12 RX ADMIN — TRAZODONE HYDROCHLORIDE 50 MG: 50 TABLET ORAL at 21:04

## 2020-08-12 ASSESSMENT — PAIN SCALES - GENERAL
PAINLEVEL_OUTOF10: 0

## 2020-08-12 NOTE — GROUP NOTE
Date: 8/12/2020    Group Start Time: 0826  Group End Time: 1100  Group Topic: Psychoeducation    [facility] ACUTE BH 1    Y[place]        Group Therapy Note                                                                        Group Therapy Note    Date: 8/12/2020  Number of Participants: 14    Type of Group: Psychoeducation    Wellness Binder Information  Module Name:  id of stressors   Patient's Goal: patient will be able to id daily and life events patient is currently experiencing. Notes:  engaged and pleasant in group, able to share when prompted. Status After Intervention:  Improved    Participation Level:  Active Listener and Interactive    Participation Quality: Appropriate, Attentive, Sharing, and Supportive      Speech:  normal       Thought Process/Content: Logical      Affective Functioning: Congruent      Mood: euthymic      Level of consciousness:  Alert, Oriented x4, and Attentive      Response to Learning: Able to verbalize/acknowledge new learning, Able to retain information, and Progressing to goal      Endings: None Reported    Modes of Intervention: Education, Support, Socialization, Exploration, and Problem-solving      Discipline Responsible: Psychoeducational Specialist      Signature:  Irvine Nissen
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
## 2020-08-12 NOTE — PLAN OF CARE
Problem: Suicide risk  Goal: Provide patient with safe environment  Description: Provide patient with safe environment  Outcome: Ongoing     Problem: Altered Mood, Depressive Behavior:  Goal: Able to verbalize and/or display a decrease in depressive symptoms  Description: Able to verbalize and/or display a decrease in depressive symptoms  Outcome: Ongoing     Problem: Altered Mood, Depressive Behavior:  Goal: Absence of self-harm  Description: Absence of self-harm  8/12/2020 1913 by Nikhil Mujica RN  Outcome: Met This Shift     Problem: Substance Abuse:  Goal: Absence of drug withdrawal signs and symptoms  Description: Absence of drug withdrawal signs and symptoms  8/12/2020 1913 by Nikhil Mujica RN  Outcome: Met This Shift

## 2020-08-12 NOTE — PLAN OF CARE
Problem: Altered Mood, Depressive Behavior:  Goal: Absence of self-harm  Description: Absence of self-harm  Outcome: Met This Shift  NO SELF HARM. DENIES SUICIDAL IDEATIONS. Problem: Substance Abuse:  Goal: Absence of drug withdrawal signs and symptoms  Description: Absence of drug withdrawal signs and symptoms  Outcome: Met This Shift  DENIES WITHDRAWAL SYMPTOMS. PT. REPORTS MEDICATIONS ARE WORKING FOR WITHDRAWAL.

## 2020-08-12 NOTE — PLAN OF CARE
[address] [place] [address]  Day 3 Interdisciplinary Treatment Plan NOTE    Review Date & Time: 8/12/20 1000    Patient was in treatment team    Admission Type:   Admission Type: Involuntary    Reason for admission:  Reason for Admission: \"I was attempting to kill myself yesterday by mixing pills and ETOH\"  Estimated Length of Stay Update:   5 DAYS  Estimated Discharge Date Update: FRIDAY    PATIENT STRENGTHS:  Patient Strengths Strengths: Communication  Patient Strengths and Limitations:Limitations: Multiple barriers to leisure interests  Addictive Behavior:Addictive Behavior  In the past 3 months, have you felt or has someone told you that you have a problem with:  : None  Do you have a history of Chemical Use?: No  Do you have a history of Alcohol Use?: Yes  Do you have a history of Street Drug Abuse?: Yes  Histroy of Prescripton Drug Abuse?: No  Medical Problems:  Past Medical History:   Diagnosis Date    Anxiety attack     Dislocation of shoulder     right    Sch[facility] ([facility] [address]        Risk:  Fall RiskTotal: 77  Ryan Scale Ryan Scale Score: 22  BVC Total: 1  Change in scores: NO FALLS OR RYAN RISK IDENTIFIED THIS SHIFT.  Changes to plan of Care : CONTINUE TO ASSESS FOR ANY CHANGE IN STATUS OR INCREASE IN RISK    Status EXAM:   Status and Exam  Normal: Yes  Facial Expression: Elevated  Affect: Congruent  Level of Consciousness: Alert  Mood:Normal: No  Mood: Anxious  Motor Activity:Normal: No  Motor Activity: Increased  Interview Behavior: Cooperative, Impulsive  Preception: [place] to Person, [place] to Time, [place] to Place, [place] to Situation  Attention:Normal: No  Attention: Distractible  Thought Processes: Tangential  Thought Content:Normal: No  Thought Content: Preoccupations  Hallucinations: None  Delusions: No  Delusions: (NONE VOICED ON A.M. ASSESSMENT)  Memory:Normal: Yes  Insight and Judgment: No  Insight and Judgment: Poor Insight, Poor Judgment  Present Suicidal Ideation: No  Present Homicidal CONTROL WITH NO UNIT PROBLEMS. PT. DENIES SUICIDAL IDEATION, HOMICIDAL IDEATIONS, AND HALLUCINATIONS ON A.M. ASSESSMENT. PT. IS TANGENTIAL IN SPEECH AT TIMES, INTELLECTUALIZES, RATIONALIZES, BUT IS PLEASANT AND ACCEPTS SUPPORT. PT.DENIES HALLUCINATIONS AND DID NOT VOICE ANY OVERT DELUSIONS ON A.M. ASSESSMENT.        Chari Cheung RN

## 2020-08-12 NOTE — GROUP NOTE
Group Therapy Note    Date: 8/12/2020    Group Start Time: 1115  Group End Time: 200  Group Topic: Cognitive Skills    [facility]    KEITH Bueno, \[facility] [place] [facility]\""        Group Therapy Note    Number of participants:14  Type of group: Cognitive Skills  Mode of intervention: Education, Support, Socialization, Exploration, Clarifying, and Problem-solving  Topic: Healthy boundaries  Objective: To identify unhealthy boundaries in interpersonal relationships and establish healthy boundaries       Notes:  Pt was an active participant in cognitive skills group focusing on communication skills and establishing healthy boundaries within interpersonal relationships. Pt was able to share personal information and provide supportive feedback to group members. Status After Intervention:  Improved    Participation Level:  Active Listener and Interactive    Participation Quality: Appropriate and Attentive      Speech:  normal      Thought Process/Content: Logical      Affective Functioning: Congruent      Mood: euthymic      Level of consciousness:  Alert, Oriented x4 and Attentive      Response to Learning: Progressing to goal      Endings: None Reported    Modes of Intervention: Education, Support, Socialization, Exploration, Clarifying and Problem-solving      Discipline Responsible: /Counselor      Signature:  KEITH Bueno, [place]

## 2020-08-12 NOTE — GROUP NOTE
Group Therapy Note    Date: 8/12/2020    Group Start Time: 0115  Group End Time: 0200  Group Topic: Cognitive Skills    [facility]    DARRION Curtis        Group Therapy Note    Attendees: 8         Patient's Goal:  Pt to identify protective factors and ways to improve coping skills    Notes:  Pt made positive connections during group      Status After Intervention:  Improved    Participation Level:  Active Listener and Interactive    Participation Quality: Appropriate, Attentive and Sharing      Speech:  normal      Thought Process/Content: Logical      Affective Functioning: Congruent      Mood: euthymic      Level of consciousness:  Oriented x4      Response to Learning: Able to verbalize current knowledge/experience and Able to retain information      Endings: None Reported    Modes of Intervention: Education, Support and Socialization      Discipline Responsible: /Counselor      Signature:  DARRION Curtis

## 2020-08-12 NOTE — PROGRESS NOTES
Anxious, Flight of ideas, states having palpitations and pain in his chest  pulse is 61 -65 and slightly irregular. States hx of black out 2 months ago. Hx of Mj . Librium 25 mg po given .

## 2020-08-13 PROCEDURE — 6370000000 HC RX 637 (ALT 250 FOR IP): Performed by: NURSE PRACTITIONER

## 2020-08-13 PROCEDURE — 6370000000 HC RX 637 (ALT 250 FOR IP): Performed by: PSYCHIATRY & NEUROLOGY

## 2020-08-13 PROCEDURE — 99232 SBSQ HOSP IP/OBS MODERATE 35: CPT | Performed by: NURSE PRACTITIONER

## 2020-08-13 PROCEDURE — 1240000000 HC EMOTIONAL WELLNESS R&B

## 2020-08-13 RX ADMIN — OLANZAPINE 10 MG: 10 TABLET, FILM COATED ORAL at 22:25

## 2020-08-13 RX ADMIN — TRAZODONE HYDROCHLORIDE 50 MG: 50 TABLET ORAL at 22:25

## 2020-08-13 RX ADMIN — FOLIC ACID 1 MG: 1 TABLET ORAL at 09:09

## 2020-08-13 RX ADMIN — LITHIUM CARBONATE 300 MG: 300 CAPSULE ORAL at 09:09

## 2020-08-13 RX ADMIN — LITHIUM CARBONATE 300 MG: 300 CAPSULE ORAL at 16:42

## 2020-08-13 RX ADMIN — Medication 100 MG: at 09:08

## 2020-08-13 ASSESSMENT — PAIN SCALES - GENERAL: PAINLEVEL_OUTOF10: 0

## 2020-08-13 NOTE — PROGRESS NOTES
BEHAVIORAL HEALTH FOLLOW-UP NOTE     8/12/2020     Patient was seen and examined in person, Chart reviewed   Patient's case discussed with staff/team    Chief Complaint: Circumstantial    Interim History: Patient seen during treatment team he is irritable and very circumstantial.  He refused to take medications does not believe that he needs them. He showing very poor insight judgment to the hospitalization need for treatment. Thought processes still disorganized. He is no longer making any homicidal threats. He denies any suicidal ideations intent or plan. He continues to ruminate about his hospitalization and not believing that he should be here. He appears guarded and paranoid    Appetite:   [x] Normal/Unchanged  [] Increased  [] Decreased      Sleep:       [x] Normal/Unchanged  [] Fair       [] Poor              Energy:    [x] Normal/Unchanged  [] Increased  [] Decreased        SI [] Present  [x] Absent    HI  []Present  [x] Absent     Aggression:  [] yes  [x] no    Patient is [x] able  [] unable to CONTRACT FOR SAFETY     PAST MEDICAL/PSYCHIATRIC HISTORY:   Past Medical History:   Diagnosis Date    Anxiety attack     Dislocation of shoulder     right    Schizophrenia ([facility] [address]        FAMILY/SOCIAL HISTORY:  History reviewed. No pertinent family history.   Social History     Socioeconomic History    Marital status:      Spouse name: Not on file    Number of children: Not on file    Years of education: Not on file    Highest education level: Not on file   Occupational History    Not on file   Social Needs    Financial resource strain: Not on file    Food insecurity     Worry: Not on file     Inability: Not on file    Transportation needs     Medical: Not on file     Non-medical: Not on file   Tobacco Use    Smoking status: Never Smoker    Smokeless tobacco: Never Used   Substance and Sexual Activity    Alcohol use: No     Comment: stopped    Drug use: Yes     Types: Marijuana     Comment: used yesterday     Sexual activity: Not on file   Lifestyle    Physical activity     Days per week: Not on file     Minutes per session: Not on file    Stress: Not on file   Relationships    Social connections     Talks on phone: Not on file     Gets together: Not on file     Attends [employer] service: Not on file     Active member of club or organization: Not on file     Attends meetings of clubs or organizations: Not on file     Relationship status: Not on file    Intimate partner violence     Fear of current or ex partner: Not on file     Emotionally abused: Not on file     Physically abused: Not on file     Forced sexual activity: Not on file   Other Topics Concern    Not on file   Social History Narrative    Not on file           ROS:  [x] All negative/unchanged except if checked.  Explain positive(checked items) below:  [] Constitutional  [] Eyes  [] Ear/Nose/Mouth/Throat  [] Respiratory  [] CV  [] GI  []   [] Musculoskeletal  [] Skin/Breast  [] Neurological  [] Endocrine  [] Heme/Lymph  [] Allergic/Immunologic    Explanation:     MEDICATIONS:    Current Facility-Administered Medications:     ibuprofen (ADVIL;MOTRIN) tablet 400 mg, 400 mg, Oral, R8Z PRN, Redajit Levi, APRN - CNP    lithium capsule 300 mg, 300 mg, Oral, BID WC, Mendy Levi, APRN - CNP, 752 mg at 08/12/20 1603    OLANZapine (ZYPREXA) tablet 10 mg, 10 mg, Oral, Nightly, Mendy Levi, APRN - CNP, 10 mg at 08/12/20 2104    chlordiazePOXIDE (LIBRIUM) capsule 25 mg, 25 mg, Oral, Q6H PRN, 25 mg at 67/97/79 4246 **AND** folic acid (FOLVITE) tablet 1 mg, 1 mg, Oral, Daily, 1 mg at 08/12/20 0814 **AND** vitamin B-1 (THIAMINE) tablet 100 mg, 100 mg, Oral, Daily, 100 mg at 08/12/20 0814 **AND** [DISCONTINUED] divalproex (DEPAKOTE) DR tablet 250 mg, 250 mg, Oral, BID, Guy Phan MD    acetaminophen (TYLENOL) tablet 650 mg, 650 mg, Oral, Q6H PRN, Guy Phan MD    hydrOXYzine (VISTARIL) capsule 50 mg, 50 mg, Oral, TID PRN, Kristi Aldridge MD    haloperidol lactate (HALDOL) injection 5 mg, 5 mg, Intramuscular, Q6H PRN **OR** haloperidol (HALDOL) tablet 5 mg, 5 mg, Oral, Q6H PRN, Kristi Aldridge MD    traZODone (DESYREL) tablet 50 mg, 50 mg, Oral, Nightly PRN, Kristi Aldridge MD, 50 mg at 08/12/20 2104    magnesium hydroxide (MILK OF MAGNESIA) 400 MG/5ML suspension 30 mL, 30 mL, Oral, Daily PRN, Kristi Aldridge MD    aluminum & magnesium hydroxide-simethicone (MAALOX) 200-200-20 MG/5ML suspension 30 mL, 30 mL, Oral, PRN, Kristi Aldridge MD      Examination:  /74   Pulse 61   Temp 97.5 °F (36.4 °C) (Oral)   Resp 16   Ht 5' 8\" (1.727 m)   Wt 194 lb (88 kg)   SpO2 100%   BMI 29.50 kg/m²   Gait - steady  Medication side effects(SE): Denies    Mental Status Examination:    Level of consciousness:  within normal limits   Appearance:  fair grooming and fair hygiene  Behavior/Motor:  no abnormalities noted  Attitude toward examiner:  cooperative  Speech:  spontaneous, normal rate and normal volume   Mood: anxious  Affect: Irritable  Thought processes:  perservative   Thought content: Appears guarded and paranoid  Cognition:  oriented to person, place, and time   Concentration distractible  Insight poor  Judgement poor    ASSESSMENT:   Patient symptoms are:  [] Well controlled  [] Improving  [] Worsening  [x] No change      Diagnosis:   Principal Problem:    [facility]oaffective [facility] ([facility]  Active Problems:    Cannabis abuse  Resolved Problems:    * No resolved hospital problems.  *      LABS:    Recent Labs     08/10/20  1501   WBC 5.0   HGB 14.5        Recent Labs     08/10/20  1501      K 3.7      CO2 23   BUN 10   CREATININE 1.4*   GLUCOSE 125*     Recent Labs     08/10/20  1501   BILITOT 0.5   ALKPHOS 64   AST 34   ALT 27     Lab Results   Component Value Date    LABAMPH NOT DETECTED 08/10/2020    BARBSCNU NOT DETECTED 08/10/2020    LABBENZ NOT DETECTED 08/10/2020    LABMETH NOT DETECTED 08/10/2020    OPIATESCREENURINE NOT DETECTED 08/10/2020    PHENCYCLIDINESCREENURINE NOT DETECTED 08/10/2020    ETOH <10 08/10/2020     Lab Results   Component Value Date    TSH 1.870 11/20/2015     No results found for: LITHIUM  Lab Results   Component Value Date    VALPROATE 3 (L) 08/10/2020         Treatment Plan:  Reviewed current Medications with the patient. Risks, benefits, side effects, drug-to-drug interactions and alternatives to treatment were discussed. Collateral information:   CD evaluation  Encourage patient to attend group and other milieu activities.   Discharge planning discussed with the patient and treatment team.    Continue lithium 300 mg twice daily for mood stabilization  Continue Zyprexa 10 mg at bedtime for psychosis and mood    PSYCHOTHERAPY/COUNSELING:  [x] Therapeutic interview  [x] Supportive  [] CBT  [] Ongoing  [] Other    [x] Patient continues to need, on a daily basis, active treatment furnished directly by or requiring the supervision of inpatient psychiatric personnel      Anticipated Length of stay: 3 to 7 days based on stability            Electronically signed by ADRIA Xie CNP on 4/32/3648 at 11:51 PM

## 2020-08-13 NOTE — GROUP NOTE
Group Therapy Note    Date: 8/13/2020    Group Start Time: 1120  Group End Time: 1200  Group Topic: Cognitive Skills    [facility]    SCARLETT Gregory        Group Therapy Note    Attendees: 15         Patient's Goal: Pt will be able to identify bad communication characteristics to avoid and identify properties of good communication to engage in instead. Notes:  Pt active in class discussion. Status After Intervention:  Improved    Participation Level:  Active Listener and Interactive    Participation Quality: Appropriate, Attentive, Sharing and Supportive      Speech:  normal      Thought Process/Content: Logical      Affective Functioning: Blunted      Mood: depressed      Level of consciousness:  Alert, Oriented x4 and Attentive      Response to Learning: Able to verbalize current knowledge/experience, Able to verbalize/acknowledge new learning and Progressing to goal      Endings: None Reported    Modes of Intervention: Education, Support, Socialization and Problem-solving      Discipline Responsible: /Counselor      Signature:  SCARLETT Montero

## 2020-08-13 NOTE — PROGRESS NOTES
BEHAVIORAL HEALTH FOLLOW-UP NOTE     8/13/2020     Patient was seen and examined in person, Chart reviewed   Patient's case discussed with staff/team    Chief Complaint: \" I am feeling better. \"    Interim History: Patient seen in his room today. He started taking his medications. Is much calmer less circumstantial.  He shows improved insight and judgment. He denies SI/HI intent or plan he denies any auditory visual hallucinations he does not appear to be internally stimulated. He attends groups he socializing with peers. Appetite:   [x] Normal/Unchanged  [] Increased  [] Decreased      Sleep:       [x] Normal/Unchanged  [] Fair       [] Poor              Energy:    [x] Normal/Unchanged  [] Increased  [] Decreased        SI [] Present  [x] Absent    HI  []Present  [x] Absent     Aggression:  [] yes  [x] no    Patient is [x] able  [] unable to CONTRACT FOR SAFETY     PAST MEDICAL/PSYCHIATRIC HISTORY:   Past Medical History:   Diagnosis Date    Anxiety attack     Dislocation of shoulder     right    Schizophrenia ([facility] [address]        FAMILY/SOCIAL HISTORY:  History reviewed. No pertinent family history.   Social History     Socioeconomic History    Marital status:      Spouse name: Not on file    Number of children: Not on file    Years of education: Not on file    Highest education level: Not on file   Occupational History    Not on file   Social Needs    Financial resource strain: Not on file    Food insecurity     Worry: Not on file     Inability: Not on file    Transportation needs     Medical: Not on file     Non-medical: Not on file   Tobacco Use    Smoking status: Never Smoker    Smokeless tobacco: Never Used   Substance and Sexual Activity    Alcohol use: No     Comment: stopped    Drug use: Yes     Types: Marijuana     Comment: used yesterday     Sexual activity: Not on file   Lifestyle    Physical activity     Days per week: Not on file     Minutes per session: Not on file    Stress: Not on file   Relationships    Social connections     Talks on phone: Not on file     Gets together: Not on file     Attends Congregation service: Not on file     Active member of club or organization: Not on file     Attends meetings of clubs or organizations: Not on file     Relationship status: Not on file    Intimate partner violence     Fear of current or ex partner: Not on file     Emotionally abused: Not on file     Physically abused: Not on file     Forced sexual activity: Not on file   Other Topics Concern    Not on file   Social History Narrative    Not on file           ROS:  [x] All negative/unchanged except if checked.  Explain positive(checked items) below:  [] Constitutional  [] Eyes  [] Ear/Nose/Mouth/Throat  [] Respiratory  [] CV  [] GI  []   [] Musculoskeletal  [] Skin/Breast  [] Neurological  [] Endocrine  [] Heme/Lymph  [] Allergic/Immunologic    Explanation:     MEDICATIONS:    Current Facility-Administered Medications:     ibuprofen (ADVIL;MOTRIN) tablet 400 mg, 400 mg, Oral, O4M PRN, ADRIA Gotti - CNP    lithium capsule 300 mg, 300 mg, Oral, BID WC, ADRIA Stern - CNP, 069 mg at 08/13/20 1642    OLANZapine (ZYPREXA) tablet 10 mg, 10 mg, Oral, Nightly, ADRIA Gotti - CNP, 10 mg at 08/12/20 2104    chlordiazePOXIDE (LIBRIUM) capsule 25 mg, 25 mg, Oral, Q6H PRN, 25 mg at 33/81/53 4575 **AND** folic acid (FOLVITE) tablet 1 mg, 1 mg, Oral, Daily, 1 mg at 08/13/20 0909 **AND** vitamin B-1 (THIAMINE) tablet 100 mg, 100 mg, Oral, Daily, 100 mg at 08/13/20 0908 **AND** [DISCONTINUED] divalproex (DEPAKOTE) DR tablet 250 mg, 250 mg, Oral, BID, Chantel Paz MD    acetaminophen (TYLENOL) tablet 650 mg, 650 mg, Oral, Q6H PRN, Chantel Paz MD    hydrOXYzine (VISTARIL) capsule 50 mg, 50 mg, Oral, TID PRN, Chantel Paz MD    haloperidol lactate (HALDOL) injection 5 mg, 5 mg, Intramuscular, Q6H PRN **OR** haloperidol (HALDOL) tablet 5 mg, 5 mg, Oral, Q6H PRN, Dayanara Scanlon MD    traZODone (DESYREL) tablet 50 mg, 50 mg, Oral, Nightly PRN, Dayanara Scanlon MD, 50 mg at 08/12/20 2104    magnesium hydroxide (MILK OF MAGNESIA) 400 MG/5ML suspension 30 mL, 30 mL, Oral, Daily PRN, Dayanara Scanlon MD    aluminum & magnesium hydroxide-simethicone (MAALOX) 200-200-20 MG/5ML suspension 30 mL, 30 mL, Oral, PRN, Dayanara Scanlon MD      Examination:  BP (!) 102/55   Pulse 56   Temp 97.2 °F (36.2 °C) (Temporal)   Resp 14   Ht 5' 8\" (1.727 m)   Wt 194 lb (88 kg)   SpO2 100%   BMI 29.50 kg/m²   Gait - steady  Medication side effects(SE): Denies    Mental Status Examination:    Level of consciousness:  within normal limits   Appearance:  fair grooming and fair hygiene  Behavior/Motor:  no abnormalities noted  Attitude toward examiner:  cooperative  Speech:  spontaneous, normal rate and normal volume   Mood: \" I feel better. \"  Affect: Appropriate pleasant  Thought processes: Linear without flight of ideas or loose associations  Thought content: Devoid of any auditory visualizations delusions or other perceptual abnormalities  Cognition:  oriented to person, place, and time   Concentration distractible  Insight poor  Judgement poor    ASSESSMENT:   Patient symptoms are:  [] Well controlled  [x] Improving  [] Worsening  [] No change      Diagnosis:   Principal Problem:    Schizoaffective disorder, bipolar type ([facility] [address]  Active Problems:    Cannabis abuse  Resolved Problems:    * No resolved hospital problems. *      LABS:    No results for input(s): WBC, HGB, PLT in the last 72 hours. No results for input(s): NA, K, CL, CO2, BUN, CREATININE, GLUCOSE in the last 72 hours. No results for input(s): BILITOT, ALKPHOS, AST, ALT in the last 72 hours.   Lab Results   Component Value Date    LABAMPH NOT DETECTED 08/10/2020    BARBSCNU NOT DETECTED 08/10/2020    LABBENZ NOT DETECTED 08/10/2020    LABMETH NOT DETECTED 08/10/2020    OPIATESCREENURINE NOT DETECTED 08/10/2020 PHENCYCLIDINESCREENURINE NOT DETECTED 08/10/2020    ETOH <10 08/10/2020     Lab Results   Component Value Date    TSH 1.870 11/20/2015     No results found for: LITHIUM  Lab Results   Component Value Date    VALPROATE 3 (L) 08/10/2020         Treatment Plan:  Reviewed current Medications with the patient. Risks, benefits, side effects, drug-to-drug interactions and alternatives to treatment were discussed. Collateral information:   CD evaluation  Encourage patient to attend group and other milieu activities.   Discharge planning discussed with the patient and treatment team.    Continue lithium 300 mg twice daily for mood stabilization  Continue Zyprexa 10 mg at bedtime for psychosis and mood    PSYCHOTHERAPY/COUNSELING:  [x] Therapeutic interview  [x] Supportive  [] CBT  [] Ongoing  [] Other    [x] Patient continues to need, on a daily basis, active treatment furnished directly by or requiring the supervision of inpatient psychiatric personnel      Anticipated Length of stay: 3 to 7 days based on stability            Electronically signed by ADRIA Thompson CNP on 8/04/5386 at 5:24 PM

## 2020-08-13 NOTE — PROGRESS NOTES
Sindhu Herrera denies SI, HI, or any Hallucinations at this time. He denies depression or anxiety. He states his meds help as he is less anxious. He continues to be focused on discharge and moving to [place] to his wife and child. Groups are offered and encouraged, He takes his meds. Will continue to monitor.

## 2020-08-13 NOTE — GROUP NOTE
Group Therapy Note    Date: 8/13/2020    Group Start Time: 7990  Group End Time: 8146  Group Topic: Psychoeducation    [facility] [address]        Group Therapy Note    Attendees: 17         Patient's Goal: Get discharged, keep cool    Notes: Active and engaged during wellness discussion. Able to identify resources and skills to maintain progress. Status After Intervention:  Improved    Participation Level:  Active Listener and Interactive    Participation Quality: Appropriate, Attentive, Sharing and Supportive      Speech:  normal      Thought Process/Content: Logical      Affective Functioning: Congruent      Mood: euthymic      Level of consciousness:  Alert and Attentive      Response to Learning: Capable of insight, Able to change behavior and Progressing to goal      Endings: None Reported    Modes of Intervention: Education, Support, Socialization and Exploration      Discipline Responsible: Psychoeducational Specialist      Signature:  Saman Whiteside

## 2020-08-13 NOTE — PLAN OF CARE
Problem: Suicide risk  Goal: Provide patient with safe environment  Description: Provide patient with safe environment  Outcome: Met This Shift     Problem: Altered Mood, Depressive Behavior:  Goal: Absence of self-harm  Description: Absence of self-harm  Outcome: Met This Shift     Problem: Altered Mood, Depressive Behavior:  Goal: Able to verbalize and/or display a decrease in depressive symptoms  Description: Able to verbalize and/or display a decrease in depressive symptoms  Outcome: Ongoing     Problem: Substance Abuse:  Goal: Absence of drug withdrawal signs and symptoms  Description: Absence of drug withdrawal signs and symptoms  Outcome: Ongoing     Sindhu Herrera denies SI, HI, or any Hallucinations at this time. He denies depression or anxiety. He states his meds help as he is less anxious. He continues to be focused on discharge and moving to [place] to his wife and child. Groups are offered and encouraged, He takes his meds. Will continue to monitor.

## 2020-08-13 NOTE — GROUP NOTE
Group Therapy Note    Date: 8/12/2020    Group Start Time: 2000  Group End Time: 2035  Group Topic: Healthy Living/Wellness    [facility]  [id number], RN        Group Therapy Note    Attendees: 10/21

## 2020-08-13 NOTE — PROGRESS NOTES
Spiritual Support Group Note    Number of Participants in Group:     8                   Time:    2    Goal: Relief from isolation and loneliness             Terese Sharing             Self-understanding and gain insight              Acceptance and belonging            Recognize they are not alone                Socialization             Empowerment       Encouragement    Topic:  [x] Spiritual Wellness and Self Care                  [x] Hope                     [] Connecting with Divine/Others        [] Thankfulness and Gratitude               [x]  Meaningfulness and Purpose               [] Forgiveness               [] Peace               [] Connect to [employer]      [] Other    Participation Level:   [x] Active Listener   [] Minimal   [] Monopolizing   [] Interactive   [] No Participation   []  Other:     Attention:   [x] Alert   [] Distractible   [] Drowsy   [] Poor   [] Other:    [place]:   [x] Cooperative   [] Suspicious   [] Withdrawn   [] Guarded   [] Irritable   [] Inhospitable   [] Other:     Others Comments from Group:

## 2020-08-14 VITALS
SYSTOLIC BLOOD PRESSURE: 106 MMHG | HEIGHT: 68 IN | TEMPERATURE: 97.8 F | DIASTOLIC BLOOD PRESSURE: 58 MMHG | RESPIRATION RATE: 14 BRPM | BODY MASS INDEX: 29.4 KG/M2 | OXYGEN SATURATION: 100 % | WEIGHT: 194 LBS | HEART RATE: 52 BPM

## 2020-08-14 PROCEDURE — 99239 HOSP IP/OBS DSCHRG MGMT >30: CPT | Performed by: NURSE PRACTITIONER

## 2020-08-14 PROCEDURE — 6370000000 HC RX 637 (ALT 250 FOR IP): Performed by: NURSE PRACTITIONER

## 2020-08-14 RX ORDER — NALTREXONE HYDROCHLORIDE 50 MG/1
50 TABLET, FILM COATED ORAL
Qty: 30 TABLET | Refills: 0 | Status: SHIPPED | OUTPATIENT
Start: 2020-08-15 | End: 2020-09-14

## 2020-08-14 RX ORDER — LITHIUM CARBONATE 300 MG/1
300 CAPSULE ORAL 2 TIMES DAILY WITH MEALS
Qty: 60 CAPSULE | Refills: 0 | Status: SHIPPED | OUTPATIENT
Start: 2020-08-14 | End: 2020-09-13

## 2020-08-14 RX ORDER — NALTREXONE HYDROCHLORIDE 50 MG/1
50 TABLET, FILM COATED ORAL
Status: DISCONTINUED | OUTPATIENT
Start: 2020-08-14 | End: 2020-08-14 | Stop reason: HOSPADM

## 2020-08-14 RX ORDER — OLANZAPINE 10 MG/1
10 TABLET ORAL NIGHTLY
Qty: 30 TABLET | Refills: 0 | Status: SHIPPED | OUTPATIENT
Start: 2020-08-14 | End: 2020-09-13

## 2020-08-14 RX ADMIN — LITHIUM CARBONATE 300 MG: 300 CAPSULE ORAL at 08:46

## 2020-08-14 RX ADMIN — NALTREXONE HYDROCHLORIDE 50 MG: 50 TABLET, FILM COATED ORAL at 10:34

## 2020-08-14 RX ADMIN — Medication 100 MG: at 08:46

## 2020-08-14 RX ADMIN — FOLIC ACID 1 MG: 1 TABLET ORAL at 08:46

## 2020-08-14 ASSESSMENT — PAIN SCALES - GENERAL: PAINLEVEL_OUTOF10: 0

## 2020-08-14 NOTE — PROGRESS NOTES
Group Therapy Note     Date: 8/14/2020     Group Start Time: 0115  Group End Time: 0200  Group Topic: Cognitive Skills     [facility]    KEITH Correa LSW           Group Therapy Note     Attendees: 10           Patient's Goal: Self-affirmations and activities related to such     Notes: Pt appeared open towards topic.      Status After Intervention:  Unchanged     Participation Level:  Active Listener     Participation Quality: Appropriate        Speech:  normal        Thought Process/Content: Linear        Affective Functioning: Congruent        Mood: depressed        Level of consciousness:  Alert and Oriented x4        Response to Learning: Able to retain information        Endings: None Reported     Modes of Intervention: Support, Socialization and Exploration        Discipline Responsible: /Counselor        Signature: KEITH Correa LSW

## 2020-08-14 NOTE — GROUP NOTE
Group Therapy Note    Date: 8/14/2020    Group Start Time: 1115  Group End Time: 1200  Group Topic: Cognitive Skills    [facility]    KEITH Arreguin, [facility]    Number of participants:17  Type of group: Cognitive Skills  Mode of intervention: Education, Support, Socialization, Exploration, Clarifying, and Problem-solving  Topic: Social Supports  Objective: To identify and evaluate current support systems     Group Therapy Note        Notes: Pt was an active participant in cognitive skills group focusing on social support systems and improving interpersonal relationships within these systems. Pt was able to share personal information and provide supportive feedback to group members. Status After Intervention:  Improved    Participation Level:  Active Listener and Interactive    Participation Quality: Appropriate, Attentive, Sharing and Supportive      Speech:  normal      Thought Process/Content: Logical      Affective Functioning: Congruent      Mood: euthymic      Level of consciousness:  Alert, Oriented x4 and Attentive      Response to Learning: Progressing to goal      Endings: None Reported    Modes of Intervention: Education, Support, Socialization, Exploration, Clarifying, Problem-solving and Activity      Discipline Responsible: /Counselor      Signature:  KEITH Arreguin [place]

## 2020-08-14 NOTE — GROUP NOTE
Date: 8/14/2020    Group Start Time: 6165  Group End Time: 1100  Group Topic: Psychoeducation    [facility]    Curly Billings, [address]                                                                            Group Therapy Note    Date: 8/14/2020    Type of Group: Psychoeducation    Wellness Binder Information  Module Name:  coping skills      Patient's Goal: patient will be able to id different types of coping skills one can use to manage daily stressors. Notes: pleasant and engaged in group, able to share when prompted. Status After Intervention:  Improved    Participation Level:  Active Listener and Interactive    Participation Quality: Appropriate, Attentive, Sharing, and Supportive      Speech: normal       Thought Process/Content: Logical      Affective Functioning: Congruent      Mood: euthymic      Level of consciousness:  Alert, Oriented x4, and Attentive      Response to Learning: Able to verbalize/acknowledge new learning, Able to retain information, and Progressing to goal      Endings: None Reported    Modes of Intervention: Education, Support, Socialization, Exploration, and Problem-solving      Discipline Responsible: Psychoeducational Specialist      Signature:  Tony Cruz

## 2020-08-14 NOTE — PROGRESS NOTES
[facility]  Discharge Note    Pt discharged with followings belongings:   Dentures: None  Vision - Corrective Lenses: None  Hearing Aid: None  Jewelry: None  Body Piercings Removed: N/A  Clothing: Shirt, Pants, Footwear, Socks  Were All Patient Medications Collected?: Not Applicable  Other Valuables: Other (Comment)([place] card)    Belongings sent home with client. No Valuables retrieved from safe. Patient education on aftercare instructions, and copy of AVS given. Patient verbalize understanding of AVS:     and med ed given. Client denies wanting to harm self or others.  Mother aware of client's discharge by SW and to inform other family members     Status EXAM upon discharge:  Status and Exam  Normal: No  Facial Expression: Brightened, Worried  Affect: Congruent  Level of Consciousness: Alert  Mood:Normal: No  Mood: Anxious, Elated  Motor Activity:Normal: No  Motor Activity: Increased  Interview Behavior: Cooperative  Preception: [place] to Person, [place]i [place] to Time, [place] to Place, [place] to Situation  Attention:Normal: Yes  Attention: (more organized)  Thought Processes: Circumstantial  Thought Content:Normal: No  Thought Content: Preoccupations  Hallucinations: None  Delusions: No  Delusions: (denies)  Memory:Normal: No  Memory: Poor Recent  Insight and Judgment: Yes  Insight and Judgment: (more organized)  Present Suicidal Ideation: No  Present Homicidal Ideation: No      Metabolic Screening:    Lab Results   Component Value Date    LABA1C 5.7 (H) 08/10/2020       Lab Results   Component Value Date    CHOL 212 (H) 08/10/2020     Lab Results   Component Value Date    TRIG 151 (H) 08/10/2020     Lab Results   Component Value Date    HDL 40 08/10/2020     No components found for: [facility] [place]  Lab Results   Component Value Date    LABVLDL 30 08/10/2020       Julian Vaughn RN

## 2020-08-14 NOTE — DISCHARGE INSTR - COC
Continuity of Care Form    Patient Name: Jemal Kumar   :  1995  MRN:  [id number]    Admit date:  8/10/2020  Discharge date:  ***    Code Status Order: Full Code   Advance Directives:   Advance Care Flowsheet Documentation     Date/Time Healthcare Directive Type of Healthcare Directive Copy in [address] Agent's Name Healthcare Agent's Phone Number    20 0028  No, patient does not have an advance directive for healthcare treatment -- -- -- -- --          Admitting Physician:  Nolan Carroll MD  PCP: Johnson Graves MD    Discharging Nurse: [facility] Unit/Room#: 3590/1744-Q  Discharging Unit Phone Number: ***    Emergency Contact:   Extended Emergency Contact Information  Primary Emergency Contact: Debi Kurtz  Address: [facility] [place]           [address] Phone: [phone]  Relation: Parent  Secondary Emergency Contact: [facility] [address]  Phone: [phone]  Relation: Brother/Sister    Past Surgical History:  Past Surgical History:   Procedure Laterality Date    DENTAL SURGERY      SHOULDER ARTHROSCOPY Right 2019    RIGHT SHOULDER ARTHROSCOPY LABRAL REPAIR  CAPSULORRHAPHY, SUBACROMIAL DECOMPRESSION performed by Beatriz Pastrana MD at [address]       Immunization History: There is no immunization history on file for this patient.     Active Problems:  Patient Active Problem List   Diagnosis Code    Rhabdomyolysis M62.82    Chronic fatigue R53.82    Elevated CK R74.8    Schizoaffective disorder, bipolar type ([facility] [address] F25.0    Cannabis abuse F12.10       Isolation/Infection:   Isolation          No Isolation        Patient Infection Status     Infection Onset Added Last Indicated Last Indicated By Review Planned Expiration Resolved Resolved By    None active    Resolved    COVID-19 Rule Out 08/10/20 08/10/20 08/10/20 COVID-19 (Ordered)   08/10/20 Rule-Out Test Resulted Nurse Assessment:  Last Vital Signs: BP (!) 106/58   Pulse 52   Temp 97.8 °F (36.6 °C) (Oral)   Resp 14   Ht 5' 8\" (1.727 m)   Wt 194 lb (88 kg)   SpO2 100%   BMI 29.50 kg/m²     Last documented pain score (0-10 scale): Pain Level: 0  Last Weight:   Wt Readings from Last 1 Encounters:   20 194 lb (88 kg)     Mental Status:  {IP PT MENTAL STATUS:09142}    IV Access:  { LINDSEY IV ACCESS:634744278}    Nursing Mobility/ADLs:  Walking   {CHP DME KPVT:482177702}  Transfer  {CHP DME QWJZ:122562297}  Bathing  {CHP DME WGWC:395067893}  Dressing  {CHP DME TFZD:646340453}  Toileting  {CHP DME LCTF:527748498}  Feeding  {CHP DME PTRF:479231352}  Med Admin  {P DME AAZA:763709335}  Med Delivery   {[facility] MED Delivery:663592456}    Wound Care Documentation and Therapy:        Elimination:  Continence:   · Bowel: {YES / :23310}  · Bladder: {YES / QY:83067}  Urinary Catheter: {Urinary Catheter:780516636}   Colostomy/Ileostomy/Ileal Conduit: {YES / V}       Date of Last BM: ***  No intake or output data in the 24 hours ending 20 1613  No intake/output data recorded.     Safety Concerns:     [address] [employer] Safety Concerns:742692411}    Impairments/Disabilities:      [address] [employer] Impairments/Disabilities:626842999}    Nutrition Therapy:  Current Nutrition Therapy:   [address] [employer] Diet List:513099049}    Routes of Feeding: {P DME Other Feedings:342257865}  Liquids: {Slp liquid thickness:35382}  Daily Fluid Restriction: {CHP DME Yes amt example:898368494}  Last Modified Barium Swallow with Video (Video Swallowing Test): {Done Not Done RHPA:396595172}    Treatments at the Time of Hospital Discharge:   Respiratory Treatments: ***  Oxygen Therapy:  {Therapy; copd oxygen:04397}  Ventilator:    { CC Vent DSVV:248236076}    Rehab Therapies: {THERAPEUTIC INTERVENTION:2635622383}  Weight Bearing Status/Restrictions: [address] NEVES Weight Bearin}  Other Medical Equipment (for information only, NOT a DME order): {EQUIPMENT:307419415}  Other Treatments: ***    Patient's personal belongings (please select all that are sent with patient):  {CHP DME Belongings:175310513}    RN SIGNATURE:  {Esignature:123439007}    CASE MANAGEMENT/SOCIAL WORK SECTION    Inpatient Status Date: ***    Readmission Risk Assessment Score:  Readmission Risk              Risk of Unplanned Readmission:        13           Discharging to Facility/ Agency   · Name:   · Address:  · Phone:  · Fax:    Dialysis Facility (if applicable)   · Name:  · Address:  · Dialysis Schedule:  · Phone:  · Fax:    / signature: {Esignature:637413813}    PHYSICIAN SECTION    Prognosis: {Prognosis:8163909008}    Condition at Discharge: [address] [place] [address] Patient Condition:517842360}    Rehab Potential (if transferring to Rehab): {Prognosis:5562590478}    Recommended Labs or Other Treatments After Discharge: ***    Physician Certification: I certify the above information and transfer of Harden Pencil  is necessary for the continuing treatment of the diagnosis listed and that he requires {Admit to Appropriate Level of Care:66559} for {GREATER/LESS:822485915} 30 days.      Update Admission H&P: {CHP DME Changes in ZAEQD:123286972}    PHYSICIAN SIGNATURE:  {Esignature:695985657}

## 2020-08-14 NOTE — PLAN OF CARE
Problem: Altered Mood, Depressive Behavior:  Goal: Absence of self-harm  Description: Absence of self-harm  Outcome: Met This Shift     Problem: Substance Abuse:  Goal: Absence of drug withdrawal signs and symptoms  Description: Absence of drug withdrawal signs and symptoms  Outcome: Met This Shift     Problem: Suicide risk  Goal: Provide patient with safe environment  Description: Provide patient with safe environment  Outcome: Ongoing     Problem: Altered Mood, Depressive Behavior:  Goal: Able to verbalize and/or display a decrease in depressive symptoms  Description: Able to verbalize and/or display a decrease in depressive symptoms  Outcome: Ongoing     Raegan Mari denies SI,HI, or Hallucinations at this time. He denies depression or any anxiety. He states he feels better, less anxious and his mood is better. He has been cooperative on calm during the shift. Groups still offered and encouraged. Patient takes his meds and attends groups sessions. Will continue to monitor.

## 2020-08-14 NOTE — PROGRESS NOTES
Toni Courser denies SI,HI, or Hallucinations at this time. He denies depression or any anxiety. He states he feels better, less anxious and his mood is better. He has been cooperative on calm during the shift. Groups still offered and encouraged. Patient takes his meds and attends groups sessions. Will continue to monitor.

## 2020-08-14 NOTE — DISCHARGE INSTR - DIET

## 2020-08-17 NOTE — DISCHARGE SUMMARY
DISCHARGE SUMMARY      Patient ID:  Ludivina Hubbard  [id number]  31 y.o.  1995    Admit date: 8/10/2020    Discharge date and time: 8/14/20    Admitting Physician: Brent Bustos MD     Discharge Physician: Dr Paula Bean MD    Admission Diagnoses: Acute depression [F32.9]    Admission Condition: poor    Discharged Condition: stable    Admission Circumstance: presented to the ED reporting suicidal ideations and reporting homicidal ideations towards his best friend. PAST MEDICAL/PSYCHIATRIC HISTORY:   Past Medical History:   Diagnosis Date    Anxiety attack     Dislocation of shoulder     right    Schizophrenia ([facility] [address]        FAMILY/SOCIAL HISTORY:  History reviewed. No pertinent family history.   Social History     Socioeconomic History    Marital status:      Spouse name: Not on file    Number of children: Not on file    Years of education: Not on file    Highest education level: Not on file   Occupational History    Not on file   Social Needs    Financial resource strain: Not on file    Food insecurity     Worry: Not on file     Inability: Not on file    Transportation needs     Medical: Not on file     Non-medical: Not on file   Tobacco Use    Smoking status: Never Smoker    Smokeless tobacco: Never Used   Substance and Sexual Activity    Alcohol use: No     Comment: stopped    Drug use: Yes     Types: Marijuana     Comment: used yesterday     Sexual activity: Not on file   Lifestyle    Physical activity     Days per week: Not on file     Minutes per session: Not on file    Stress: Not on file   Relationships    Social connections     Talks on phone: Not on file     Gets together: Not on file     Attends Gnosticism service: Not on file     Active member of club or organization: Not on file     Attends meetings of clubs or organizations: Not on file     Relationship status: Not on file    Intimate partner violence     Fear of current or ex partner: Not on file     Emotionally abused: Not on file     Physically abused: Not on file     Forced sexual activity: Not on file   Other Topics Concern    Not on file   Social History Narrative    Not on file       MEDICATIONS:  No current facility-administered medications for this encounter. Current Outpatient Medications:     lithium 300 MG capsule, Take 1 capsule by mouth 2 times daily (with meals), Disp: 60 capsule, Rfl: 0    naltrexone (DEPADE) 50 MG tablet, Take 1 tablet by mouth daily (with breakfast), Disp: 30 tablet, Rfl: 0    OLANZapine (ZYPREXA) 10 MG tablet, Take 1 tablet by mouth nightly, Disp: 30 tablet, Rfl: 0    ibuprofen (ADVIL;MOTRIN) 800 MG tablet, Take 1 tablet by mouth every 6 hours as needed for Pain (Patient not taking: Reported on 3/12/2020), Disp: 40 tablet, Rfl: 2    Examination:  BP (!) 106/58   Pulse 52   Temp 97.8 °F (36.6 °C) (Oral)   Resp 14   Ht 5' 8\" (1.727 m)   Wt 194 lb (88 kg)   SpO2 100%   BMI 29.50 kg/m²   Gait - steady    HOSPITAL COURSE[de-identified]  Patient is a 2 on 8/10/2020 squishy monitor for suicidal and homicidal ideations. He was evaluated and was treated with lithium 300 mg twice daily for mood stabilization, Zyprexa 10 mg at bedtime for psychosis and naltrexone 50 mg daily for alcohol cravings. He is given a prescription to have his lithium checked on an outpatient basis. Medical instrument significant and patient continued to improve on the floor. He is are coming out of his room he was attending groups he was socializing with peers. He never made any suicidal or homicidal threats or statements while in the unit.  obtain confirmation patient's mother who reported that patient has made the same threats in the past and patient has no access to any weapons.  obtain confirmation of the patient's mother and Roxie Mari who reports that she has no concerns, as she feels the pt has progressed.  Sw disclosed that this pt was having HI regarding his cousin, brother, and friend (as the pt states that he did not have any of their numbers). Mother was aware of this HI. She states that she has gotten rid of all the guns. Pt's mother even states that she replaced all his silverware with plastic silverware. Treatment team for the patient to the maximum benefit from hospitalization. He was set up with outpatient mental health agency for outpatient follow-up services. He was seen in treatment team prior to discharge he was calm and cooperative he was thankful for the help is received to receive the medications were working well for many plan to continue taking them. The time of discharge patient not showing impulsive behavior. He really denied any suicide homicidal ideations intent or plan. He was eating well and sleeping well there are no neurovegetative signs of depression. Not a auditory visualizations or no overt or covert sign psychosis. He was appreciative of the help that he received here. This patient no longer meets criteria for inpatient hospitalization        No AVH or paranoid thoughts  No hopeless or worthless feeling  No active SI/HI  Appetite:  [x] Normal  [] Increased  [] Decreased    Sleep:       [x] Normal  [] Fair       [] Poor            Energy:    [x] Normal  [] Increased  [] Decreased     SI [] Present  [x] Absent  HI  []Present  [x] Absent   Aggression:  [] yes  [x] no  Patient is [x] able  [] unable to CONTRACT FOR SAFETY   Medication side effects(SE):  [x] None(Psych. Meds.) [] Other      Mental Status Examination on discharge:    Level of consciousness:  within normal limits   Appearance:  well-appearing  Behavior/Motor:  no abnormalities noted  Attitude toward examiner:  attentive and good eye contact  Speech:  spontaneous, normal rate and normal volume   Mood: \"My mood is a lot better. \"  Affect: Appropriate and pleasant  Thought processes: Linear without flight of ideas or loose associations  Thought content: Devoid of any auditory visualizations delusions or other perceptual abnormalities  Cognition:  oriented to person, place, and time   Concentration intact  Memory intact  Insight good   Judgement fair   Fund of Knowledge adequate      ASSESSMENT:  Patient symptoms are:  [x] Well controlled  [x] Improving  [] Worsening  [] No change    Reason for more than one antipsychotic:  [x] N/A  [] 3 Failed Monotherapy attempts (Drugs tried:)  [] Crossover to a new antipsychotic  [] Taper to Monotherapy from Polypharmacy  [] Augmentation of clozapine therapy due to treatment resistance to single therapy    Diagnosis:  Principal Problem:    Schizoaffective disorder, bipolar type ([facility] [address]  Active Problems:    Cannabis abuse  Resolved Problems:    * No resolved hospital problems. *      LABS:    No results for input(s): WBC, HGB, PLT in the last 72 hours. No results for input(s): NA, K, CL, CO2, BUN, CREATININE, GLUCOSE in the last 72 hours. No results for input(s): BILITOT, ALKPHOS, AST, ALT in the last 72 hours. Lab Results   Component Value Date    LABAMPH NOT DETECTED 08/10/2020    BARBSCNU NOT DETECTED 08/10/2020    LABBENZ NOT DETECTED 08/10/2020    LABMETH NOT DETECTED 08/10/2020    OPIATESCREENURINE NOT DETECTED 08/10/2020    PHENCYCLIDINESCREENURINE NOT DETECTED 08/10/2020    ETOH <10 08/10/2020     Lab Results   Component Value Date    TSH 1.870 11/20/2015     No results found for: LITHIUM  Lab Results   Component Value Date    VALPROATE 3 (L) 08/10/2020       RISK ASSESSMENT AT DISCHARGE: Low risk for suicide and homicide. Treatment Plan:  Reviewed current Medications with the patient. Education provided on the complaince with treatment. Risks, benefits, side effects, drug-to-drug interactions and alternatives to treatment were discussed. Encourage patient to attend outpatient follow up appointment and therapy. Patient was advised to call the outpatient provider, visit the nearest ED or call 911 if symptoms are not manageable.      Patient's family member was contacted prior to the discharge.          Medication List      START taking these medications    lithium 300 MG capsule  Take 1 capsule by mouth 2 times daily (with meals)     naltrexone 50 MG tablet  Commonly known as:  DEPADE  Take 1 tablet by mouth daily (with breakfast)     OLANZapine 10 MG tablet  Commonly known as:  ZYPREXA  Take 1 tablet by mouth nightly        CONTINUE taking these medications    ibuprofen 800 MG tablet  Commonly known as:  ADVIL;MOTRIN  Take 1 tablet by mouth every 6 hours as needed for Pain           Where to Get Your Medications      These medications were sent to [facility] [address] [address]    Phone:  [phone]   · lithium 300 MG capsule  · naltrexone 50 MG tablet  · OLANZapine 10 MG tablet     Patient is counseled if he continues to abuse drugs or alcohol he could act out impulsively causing serious harm to himself or others even though may be unintentional.  He demonstrated understanding of this and has the capacity understand this    Patient is counseled he must remain compliant with all medications outpatient follow-up appointments    Patient is discharged home in stable condition      TIME SPEND - 35 MINUTES TO COMPLETE THE EVALUATION, DISCHARGE SUMMARY, MEDICATION RECONCILIATION AND FOLLOW UP CARE     Signed:  Marshall Evans  4/61/2041  6:08 PM

## 2020-09-30 ENCOUNTER — HOSPITAL ENCOUNTER (EMERGENCY)
Age: 25
Discharge: HOME | End: 2020-10-01
Attending: EMERGENCY MEDICINE
Payer: MEDICAID

## 2020-09-30 LAB
ALBUMIN SERPL-MCNC: 4.4 G/DL (ref 3.5–5.2)
ALP BLD-CCNC: 73 U/L (ref 40–129)
ALT SERPL-CCNC: 39 U/L (ref 0–40)
ANION GAP SERPL CALCULATED.3IONS-SCNC: 12 MMOL/L (ref 7–16)
AST SERPL-CCNC: 35 U/L (ref 0–39)
BASOPHILS ABSOLUTE: 0.03 E9/L (ref 0–0.2)
BASOPHILS RELATIVE PERCENT: 0.7 % (ref 0–2)
BILIRUB SERPL-MCNC: 0.3 MG/DL (ref 0–1.2)
BUN BLDV-MCNC: 9 MG/DL (ref 6–20)
CALCIUM SERPL-MCNC: 9.1 MG/DL (ref 8.6–10.2)
CHLORIDE BLD-SCNC: 106 MMOL/L (ref 98–107)
CO2: 23 MMOL/L (ref 22–29)
CREAT SERPL-MCNC: 1.3 MG/DL (ref 0.7–1.2)
EOSINOPHILS ABSOLUTE: 0.06 E9/L (ref 0.05–0.5)
EOSINOPHILS RELATIVE PERCENT: 1.4 % (ref 0–6)
GFR AFRICAN AMERICAN: >60
GFR NON-AFRICAN AMERICAN: >60 ML/MIN/1.73
GLUCOSE BLD-MCNC: 86 MG/DL (ref 74–99)
HCT VFR BLD CALC: 42 % (ref 37–54)
HEMOGLOBIN: 13.6 G/DL (ref 12.5–16.5)
IMMATURE GRANULOCYTES #: 0.02 E9/L
IMMATURE GRANULOCYTES %: 0.5 % (ref 0–5)
LYMPHOCYTES ABSOLUTE: 1.57 E9/L (ref 1.5–4)
LYMPHOCYTES RELATIVE PERCENT: 36 % (ref 20–42)
MCH RBC QN AUTO: 26.6 PG (ref 26–35)
MCHC RBC AUTO-ENTMCNC: 32.4 % (ref 32–34.5)
MCV RBC AUTO: 82 FL (ref 80–99.9)
MONOCYTES ABSOLUTE: 0.41 E9/L (ref 0.1–0.95)
MONOCYTES RELATIVE PERCENT: 9.4 % (ref 2–12)
NEUTROPHILS ABSOLUTE: 2.27 E9/L (ref 1.8–7.3)
NEUTROPHILS RELATIVE PERCENT: 52 % (ref 43–80)
PDW BLD-RTO: 14.6 FL (ref 11.5–15)
PLATELET # BLD: 252 E9/L (ref 130–450)
PMV BLD AUTO: 11.2 FL (ref 7–12)
POTASSIUM SERPL-SCNC: 3.7 MMOL/L (ref 3.5–5)
RBC # BLD: 5.12 E12/L (ref 3.8–5.8)
SODIUM BLD-SCNC: 141 MMOL/L (ref 132–146)
TOTAL CK: 1414 U/L (ref 20–200)
TOTAL PROTEIN: 7 G/DL (ref 6.4–8.3)
WBC # BLD: 4.4 E9/L (ref 4.5–11.5)

## 2020-09-30 PROCEDURE — 80178 ASSAY OF LITHIUM: CPT

## 2020-09-30 PROCEDURE — 85025 COMPLETE CBC W/AUTO DIFF WBC: CPT

## 2020-09-30 PROCEDURE — 99282 EMERGENCY DEPT VISIT SF MDM: CPT

## 2020-09-30 PROCEDURE — 82550 ASSAY OF CK (CPK): CPT

## 2020-09-30 PROCEDURE — 2580000003 HC RX 258: Performed by: NURSE PRACTITIONER

## 2020-09-30 PROCEDURE — 96360 HYDRATION IV INFUSION INIT: CPT

## 2020-09-30 PROCEDURE — 99283 EMERGENCY DEPT VISIT LOW MDM: CPT

## 2020-09-30 PROCEDURE — 80053 COMPREHEN METABOLIC PANEL: CPT

## 2020-09-30 PROCEDURE — 96361 HYDRATE IV INFUSION ADD-ON: CPT

## 2020-09-30 RX ORDER — 0.9 % SODIUM CHLORIDE 0.9 %
1000 INTRAVENOUS SOLUTION INTRAVENOUS ONCE
Status: COMPLETED | OUTPATIENT
Start: 2020-10-01 | End: 2020-10-01

## 2020-09-30 RX ORDER — 0.9 % SODIUM CHLORIDE 0.9 %
1000 INTRAVENOUS SOLUTION INTRAVENOUS ONCE
Status: COMPLETED | OUTPATIENT
Start: 2020-09-30 | End: 2020-09-30

## 2020-09-30 RX ADMIN — SODIUM CHLORIDE 1000 ML: 9 INJECTION, SOLUTION INTRAVENOUS at 22:18

## 2020-09-30 ASSESSMENT — PAIN SCALES - GENERAL: PAINLEVEL_OUTOF10: 9

## 2020-09-30 ASSESSMENT — PAIN DESCRIPTION - LOCATION: LOCATION: SHOULDER

## 2020-09-30 ASSESSMENT — PAIN DESCRIPTION - ORIENTATION: ORIENTATION: RIGHT

## 2020-10-01 ENCOUNTER — APPOINTMENT (OUTPATIENT)
Dept: GENERAL RADIOLOGY | Age: 25
End: 2020-10-01
Payer: MEDICAID

## 2020-10-01 VITALS
SYSTOLIC BLOOD PRESSURE: 133 MMHG | WEIGHT: 208 LBS | OXYGEN SATURATION: 100 % | DIASTOLIC BLOOD PRESSURE: 80 MMHG | HEIGHT: 68 IN | BODY MASS INDEX: 31.52 KG/M2 | RESPIRATION RATE: 20 BRPM | TEMPERATURE: 97.5 F | HEART RATE: 78 BPM

## 2020-10-01 LAB
BILIRUBIN URINE: NEGATIVE
BLOOD, URINE: NEGATIVE
CLARITY: CLEAR
COLOR: YELLOW
EKG ATRIAL RATE: 55 BPM
EKG P AXIS: 77 DEGREES
EKG P-R INTERVAL: 134 MS
EKG Q-T INTERVAL: 432 MS
EKG QRS DURATION: 80 MS
EKG QTC CALCULATION (BAZETT): 413 MS
EKG R AXIS: 66 DEGREES
EKG T AXIS: 51 DEGREES
EKG VENTRICULAR RATE: 55 BPM
GLUCOSE URINE: NEGATIVE MG/DL
KETONES, URINE: NEGATIVE MG/DL
LEUKOCYTE ESTERASE, URINE: NEGATIVE
LITHIUM DOSE AMOUNT: ABNORMAL
LITHIUM LEVEL: <0.1 MMOL/L (ref 0.5–1.5)
NITRITE, URINE: NEGATIVE
PH UA: 6 (ref 5–9)
PROTEIN UA: NEGATIVE MG/DL
SPECIFIC GRAVITY UA: >=1.03 (ref 1–1.03)
UROBILINOGEN, URINE: 0.2 E.U./DL

## 2020-10-01 PROCEDURE — 2580000003 HC RX 258: Performed by: NURSE PRACTITIONER

## 2020-10-01 PROCEDURE — 93010 ELECTROCARDIOGRAM REPORT: CPT | Performed by: INTERNAL MEDICINE

## 2020-10-01 PROCEDURE — 93005 ELECTROCARDIOGRAM TRACING: CPT | Performed by: NURSE PRACTITIONER

## 2020-10-01 PROCEDURE — 71045 X-RAY EXAM CHEST 1 VIEW: CPT | Performed by: RADIOLOGY

## 2020-10-01 PROCEDURE — 81003 URINALYSIS AUTO W/O SCOPE: CPT

## 2020-10-01 PROCEDURE — 71045 X-RAY EXAM CHEST 1 VIEW: CPT

## 2020-10-01 RX ADMIN — SODIUM CHLORIDE 1000 ML: 9 INJECTION, SOLUTION INTRAVENOUS at 00:00

## 2020-10-01 NOTE — ED PROVIDER NOTES
ED Attending  CC: Danae         Department of Emergency Medicine   ED  Provider Note  Admit Date/RoomTime: 9/30/2020  9:46 PM  ED Room: Beni PALMER  MRN: [id number]  Chief Complaint:   Fatigue (Muscles feel weak and lightheaded. ) and Shoulder Injury (Had surgery last june and still has pain.)       History of Present Illness   Source of history provided by:  patient. History/Exam Limitations: none. Lorraine Van is a 31 y.o. male who has a past medical history of:   Past Medical History:   Diagnosis Date    Anxiety attack     Dislocation of shoulder     right    Schizophrenia ([address]     presents to the ED by ambulance for fatigue, muscle aches, and feeling lightheaded. , beginning 3 months ago and are unchanged since that time. The complaint has been persistent, moderate in severity. He is also complaining of chronic right shoulder pain and had an arthroscopic surgery last year. States nothing makes his symptoms better or worse. States he has a history of dehydration with elevated \"muscle enzymes\". He denies headache, fever, chills, chest pain, shortness of breath, cough, abdominal pain, nausea, vomiting, diarrhea, constipation, dysuria, rash, or trauma. Patient is not a good historian related to his current medications. ROS   Pertinent positives and negatives are stated within HPI, all other systems reviewed and are negative. Past Surgical History:   Procedure Laterality Date    DENTAL SURGERY      SHOULDER ARTHROSCOPY Right 6/13/2019    RIGHT SHOULDER ARTHROSCOPY LABRAL REPAIR  CAPSULORRHAPHY, SUBACROMIAL DECOMPRESSION performed by Robb Dutton MD at [place] [address] History:  reports that he has never smoked. He has never used smokeless tobacco. He reports current alcohol use. He reports current drug use. Drug: Marijuana. Family History: family history is not on file.    Allergies: Depakote [divalproex sodium]    Physical Exam Section   Oxygen Saturation Interpretation: Normal.   ED Triage Vitals   BP Temp Temp Source Pulse Resp SpO2 Height Weight   09/30/20 2138 09/30/20 2109 09/30/20 2109 09/30/20 2109 09/30/20 2109 09/30/20 2109 09/30/20 2109 09/30/20 2109   (!) 163/100 97.5 °F (36.4 °C) Skin 75 18 97 % 5' 9\" (1.753 m) 195 lb (88.5 kg)       Physical Exam  · Constitutional/General: Alert and oriented x3, well appearing, non toxic. · HEENT:  NC/NT. PERRLA,  Airway patent. · Neck: Supple, full ROM, non tender to palpation in the midline, no stridor, no crepitus, no meningeal signs  · Respiratory: Lungs clear to auscultation bilaterally, no wheezes, rales, or rhonchi. Not in respiratory distress  · CV:  Regular rate. Regular rhythm. No murmurs, gallops, or rubs. 2+ distal pulses  · Chest: No chest wall tenderness  · GI:  Abdomen Soft, Non tender, Non distended. +BS. No rebound, guarding, or rigidity. No pulsatile masses. · Musculoskeletal: Moves all extremities x 4. Warm and well perfused, no clubbing, cyanosis, or edema. Capillary refill <3 seconds  · Integument: skin warm and dry. No rashes.    · Lymphatic: no lymphadenopathy noted  · Neurologic: GCS 15, no focal deficits, symmetric strength 5/5 in the upper and lower extremities bilaterally  · Psychiatric: Normal Affect      Lab / Imaging Results   (All laboratory and radiology results have been personally reviewed by myself)  Labs:  Results for orders placed or performed during the hospital encounter of 09/30/20   CBC Auto Differential   Result Value Ref Range    WBC 4.4 (L) 4.5 - 11.5 E9/L    RBC 5.12 3.80 - 5.80 E12/L    Hemoglobin 13.6 12.5 - 16.5 g/dL    Hematocrit 42.0 37.0 - 54.0 %    MCV 82.0 80.0 - 99.9 fL    MCH 26.6 26.0 - 35.0 pg    MCHC 32.4 32.0 - 34.5 %    RDW 14.6 11.5 - 15.0 fL    Platelets 226 022 - 298 E9/L    MPV 11.2 7.0 - 12.0 fL    Neutrophils % 52.0 43.0 - 80.0 %    Immature Granulocytes % 0.5 0.0 - 5.0 %    Lymphocytes % 36.0 20.0 - 42.0 %    Monocytes % 9.4 2.0 - 12.0 %    Eosinophils % 1.4 0.0 - 6.0 % Basophils % 0.7 0.0 - 2.0 %    Neutrophils Absolute 2.27 1.80 - 7.30 E9/L    Immature Granulocytes # 0.02 E9/L    Lymphocytes Absolute 1.57 1.50 - 4.00 E9/L    Monocytes Absolute 0.41 0.10 - 0.95 E9/L    Eosinophils Absolute 0.06 0.05 - 0.50 E9/L    Basophils Absolute 0.03 0.00 - 0.20 E9/L   CK   Result Value Ref Range    Total CK 1,414 (H) 20 - 200 U/L   Comprehensive Metabolic Panel   Result Value Ref Range    Sodium 141 132 - 146 mmol/L    Potassium 3.7 3.5 - 5.0 mmol/L    Chloride 106 98 - 107 mmol/L    CO2 23 22 - 29 mmol/L    Anion Gap 12 7 - 16 mmol/L    Glucose 86 74 - 99 mg/dL    BUN 9 6 - 20 mg/dL    CREATININE 1.3 (H) 0.7 - 1.2 mg/dL    GFR Non-African American >60 >=60 mL/min/1.73    GFR African American >60     Calcium 9.1 8.6 - 10.2 mg/dL    Total Protein 7.0 6.4 - 8.3 g/dL    Alb 4.4 3.5 - 5.2 g/dL    Total Bilirubin 0.3 0.0 - 1.2 mg/dL    Alkaline Phosphatase 73 40 - 129 U/L    ALT 39 0 - 40 U/L    AST 35 0 - 39 U/L   Lithium level   Result Value Ref Range    Lithium Lvl <0.10 (L) 0.50 - 1.50 mmol/L    Lithium Dose Amount Unknown    Urinalysis   Result Value Ref Range    Color, UA Yellow Straw/Yellow    Clarity, UA Clear Clear    Glucose, Ur Negative Negative mg/dL    Bilirubin Urine Negative Negative    Ketones, Urine Negative Negative mg/dL    Specific Gravity, UA >=1.030 1.005 - 1.030    Blood, Urine Negative Negative    pH, UA 6.0 5.0 - 9.0    Protein, UA Negative Negative mg/dL    Urobilinogen, Urine 0.2 <2.0 E.U./dL    Nitrite, Urine Negative Negative    Leukocyte Esterase, Urine Negative Negative     Imaging: All Radiology results interpreted by Radiologist unless otherwise noted. XR CHEST PORTABLE   Final Result   Limited study, due to a poor inspiratory effort, no gross   abnormality                       EKG #1:  Interpreted by emergency department physician unless otherwise noted. Time:  0006    Rate: 55  Rhythm: Sinus. Interpretation: sinus bradycardia.       ED Course / Medical Decision Making     Medications   0.9 % sodium chloride bolus (has no administration in time range)   0.9 % sodium chloride bolus (0 mLs Intravenous Stopped 9/30/20 6246)        Re-Evaluations:  9/30/20      Time: 0010    Patients symptoms show no change. Resting in no distress. Consultations:             None    Procedures:   none    MDM: Patient presented with fatigue chronic right shoulder pain, dizziness. Diagnostics reviewed and discussed with him. His creatinine was 1141. This is compared to his chronic elevated levels. Patient appears well, comfortable, nontoxic, in no distress. He had no chest pain. He was hydrated with IV fluid. He is appropriate for discharge outpatient follow-up with primary care and his orthopedic surgeon. He was instructed on continuing oral hydration at home. He is instructed to return to the emergency department immediately with any new or worsening symptoms. Counseling:   I have spoken with the patient and discussed todays results, in addition to providing specific details for the plan of care and counseling regarding the diagnosis and prognosis and are agreeable with the plan. Assessment      1. Elevated CK    2. Chronic right shoulder pain      This patient's ED course included: a personal history and physicial examination  This patient has remained hemodynamically stable during their ED course. Plan   Discharge to home. Patient condition is good. New Medications     New Prescriptions    No medications on file     Electronically signed by ADRIA Grover CNP   DD: 9/30/20  **This report was transcribed using voice recognition software. Every effort was made to ensure accuracy; however, inadvertent computerized transcription errors may be present.   END OF PROVIDER NOTE       ADRIA Joseph CNP  10/01/20 7992
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
## 2024-01-28 NOTE — DISCHARGE SUMMARY
[facility] Physician Discharge Summary       Joann Smith MD  [address] [facility] [id number]          Neurology Appointment as soon as possible in the next week  Nephrologist as outpatient as soon as possible   Activity level: As tolerated    Diet: Renal     Dispo: Home      Patient ID:  Keena Fontaine  [id number]  31 y.o.  1995    Admit date: 3/14/2020    Discharge date and time:  3/17/2020  8:18 PM    Admission Diagnoses: Active Problems:    Rhabdomyolysis  Resolved Problems:    * No resolved hospital problems. *      Discharge Diagnoses: Active Problems:    Rhabdomyolysis  Resolved Problems:    * No resolved hospital problems. *      Consults:  IP CONSULT TO SPIRITUAL SERVICES  IP CONSULT TO SOCIAL WORK    Procedures: None     Hospital Course: Patient was admitted with Rhabdomyolysis [M62.82]  Rhabdomyolysis [M62.82]. HPI per the chart :-  This is 44-year-old -American male with past medical history of anxiety 8 days depression, recent left rotator cuff surgery came from home due to extreme fatigue and passing out. History taken from the patient at the bedside, he is saying he is feeling extremely weak and almost  passing out but he did not pass out as per patient. He had right rotator cuff surgery and he had a lot of pain offered right shoulder due to the surgery he received physical therapy and he was taking ibuprofen and neck Peroxin for this pain. Recently he lost his job due to right shoulder pain and he cannot do physical work. He denies any chest pain, shortness of breath, abdominal pain or any muscle pain. In ER work-up his creatinine 1.3, CPK 2777, his tox screen urine shows cannabinoids.     During the patient's hospital stay patient was treated for rhabdomyolysis with CK on admission of 2777, the cause was an indeterminate with the differentials of high intensity kickboxing workout over 2 hours a few days lymphadenopathy  Pulmonary/Chest: clear to auscultation bilaterally- no wheezes, rales or rhonchi, normal air movement, no respiratory distress  Cardiovascular: normal rate, regular rhythm, normal S1 and S2, no murmurs, rubs, clicks, or gallops, distal pulses intact, no carotid bruits  Abdomen: soft, non-tender, non-distended, normal bowel sounds, no masses or organomegaly  Extremities: no cyanosis, clubbing or edema  Musculoskeletal: normal range of motion, no joint swelling, deformity or tenderness  Neurologic: reflexes normal and symmetric, no cranial nerve deficit, gait, coordination and speech normal    I/O last 3 completed shifts: In: 2955 [P.O.:480; I.V.:2475]  Out: -   No intake/output data recorded. LABS:  Recent Labs     03/15/20  0425 03/16/20  0430    137   K 4.2 4.3   * 105   CO2 21* 23   BUN 10 10   CREATININE 1.3* 1.3*   GLUCOSE 97 99   CALCIUM 8.7 8.6       Recent Labs     03/15/20  0425 03/16/20  0430   WBC 4.4* 3.7*   RBC 4.71 5.01   HGB 12.9 13.2   HCT 38.6 41.6   MCV 82.0 83.0   MCH 27.4 26.3   MCHC 33.4 31.7*   RDW 13.6 13.8    211   MPV 10.9 11.0       No results for input(s): POCGLU in the last 72 hours.     CBC:   Lab Results   Component Value Date    WBC 3.7 03/16/2020    RBC 5.01 03/16/2020    HGB 13.2 03/16/2020    HCT 41.6 03/16/2020    MCV 83.0 03/16/2020    MCH 26.3 03/16/2020    MCHC 31.7 03/16/2020    RDW 13.8 03/16/2020     03/16/2020    MPV 11.0 03/16/2020     CBC with Differential:    Lab Results   Component Value Date    WBC 3.7 03/16/2020    RBC 5.01 03/16/2020    HGB 13.2 03/16/2020    HCT 41.6 03/16/2020     03/16/2020    MCV 83.0 03/16/2020    MCH 26.3 03/16/2020    MCHC 31.7 03/16/2020    RDW 13.8 03/16/2020    LYMPHOPCT 43.9 03/16/2020    MONOPCT 8.4 03/16/2020    BASOPCT 1.1 03/16/2020    MONOSABS 0.31 03/16/2020    LYMPHSABS 1.62 03/16/2020    EOSABS 0.08 03/16/2020    BASOSABS 0.04 03/16/2020     BMP:    Lab Results   Component Value No indicators present

## (undated) DEVICE — COVER DSG W7 7 8XL11IN WHT POR CLTH PRECUT WTRPRF MEDIPORE

## (undated) DEVICE — PACK PROCEDURE SURG GEN CUST

## (undated) DEVICE — TUBING PMP L16FT MAIN DISP FOR AR-6400 AR-6475

## (undated) DEVICE — ALCOHOL RUBBING ISO 16OZ 70%

## (undated) DEVICE — GOWN,SIRUS,FABRNF,L,20/CS: Brand: MEDLINE

## (undated) DEVICE — PASSER SUT 90DEG STR SUTLASSO SD

## (undated) DEVICE — [TOMCAT CUTTER, ARTHROSCOPIC SHAVER BLADE,  DO NOT RESTERILIZE,  DO NOT USE IF PACKAGE IS DAMAGED,  KEEP DRY,  KEEP AWAY FROM SUNLIGHT]: Brand: FORMULA

## (undated) DEVICE — DRAPE,SHOULDER,ORTHOMAX,W/POUCH,5/CS: Brand: MEDLINE

## (undated) DEVICE — DRESSING,GAUZE,XEROFORM,CURAD,1"X8",ST: Brand: CURAD

## (undated) DEVICE — PASSER SUT DIA1.8MM 25DEG R TIGHT CRV STIFF SHFT SHRP

## (undated) DEVICE — DRAPE,REIN 53X77,STERILE: Brand: MEDLINE

## (undated) DEVICE — TOWEL,OR,DSP,ST,BLUE,STD,6/PK,12PK/CS: Brand: MEDLINE

## (undated) DEVICE — ASPIRATOR FLR FLUID DIAMOND

## (undated) DEVICE — STANDARD HYPODERMIC NEEDLE,POLYPROPYLENE HUB: Brand: MONOJECT

## (undated) DEVICE — SOLUTION IV IRRIG LACTATED RINGERS 3000ML 2B7487

## (undated) DEVICE — DRAPE,U/ SHT,SPLIT,PLAS,STERIL: Brand: MEDLINE

## (undated) DEVICE — PACK,SHOULDER SPLIT: Brand: MEDLINE

## (undated) DEVICE — SUPER TURBOVAC 90 INTEGRATED CABLE WAND ICW: Brand: COBLATION

## (undated) DEVICE — SYRINGE MED 30ML STD CLR PLAS LUERLOCK TIP N CTRL DISP

## (undated) DEVICE — NEEDLE FLTR 18GA L1.5IN MEM THK5UM BLNT DISP

## (undated) DEVICE — TUBING, SUCTION, 9/32" X 10', STRAIGHT: Brand: MEDLINE

## (undated) DEVICE — TAPE ADH W3INXL10YD WHT COT WVN BK POWERFUL RUB BASE HIGHLY

## (undated) DEVICE — STRIP,CLOSURE,WOUND,MEDI-STRIP,1/2X4: Brand: MEDLINE

## (undated) DEVICE — SUTURE VIC +  4-0 27 IN PS1 UD VCP935H

## (undated) DEVICE — CHLORAPREP 26ML ORANGE

## (undated) DEVICE — DOUBLE BASIN SET: Brand: MEDLINE INDUSTRIES, INC.

## (undated) DEVICE — SLEEVE TRAC SPANDEX LAT W/ 4IN COBAN SUPERFICIAL RAD NRV PD

## (undated) DEVICE — PAD,ABDOMINAL,5"X9",ST,LF,25/BX: Brand: MEDLINE INDUSTRIES, INC.

## (undated) DEVICE — NEEDLE SPNL L3.5IN PNK HUB S STL REG WALL FIT STYL W/ QNCKE

## (undated) DEVICE — KIT PERC INSRT 17GA SPNL NDL 1.1MM NIT GWIRE PORTAL DIL

## (undated) DEVICE — 4-PORT MANIFOLD: Brand: NEPTUNE 2

## (undated) DEVICE — GOWN,SIRUS,POLYRNF,BRTHSLV,XLN/XL,20/CS: Brand: MEDLINE

## (undated) DEVICE — GAUZE,SPONGE,4"X4",8PLY,STRL,LF,10/TRAY: Brand: MEDLINE

## (undated) DEVICE — CANNULA ARTHSCP L7CM DIA7MM TRNSLUC THRD FLX W/ NO SQUIRT

## (undated) DEVICE — 3M™ COBAN™ NL STERILE NON-LATEX SELF-ADHERENT WRAP, 2084S, 4 IN X 5 YD (10 CM X 4,5 M), 18 ROLLS/CASE: Brand: 3M™ COBAN™

## (undated) DEVICE — SYRINGE MED 50ML LUERLOCK TIP

## (undated) DEVICE — IMMOBILIZER SHLDR L10.5-17IN D7IN SLNG W/ 15DEG ABD PLLW

## (undated) DEVICE — 1000 S-DRAPE TOWEL DRAPE 10/BX: Brand: STERI-DRAPE™

## (undated) DEVICE — GOWN,SIRUS,FABRNF,XL,20/CS: Brand: MEDLINE

## (undated) DEVICE — INTENDED FOR TISSUE SEPARATION, AND OTHER PROCEDURES THAT REQUIRE A SHARP SURGICAL BLADE TO PUNCTURE OR CUT.: Brand: BARD-PARKER ® STAINLESS STEEL BLADES

## (undated) DEVICE — 3M™ STERI-DRAPE™ U-DRAPE 1015: Brand: STERI-DRAPE™